# Patient Record
Sex: MALE | Race: WHITE | NOT HISPANIC OR LATINO | Employment: FULL TIME | URBAN - METROPOLITAN AREA
[De-identification: names, ages, dates, MRNs, and addresses within clinical notes are randomized per-mention and may not be internally consistent; named-entity substitution may affect disease eponyms.]

---

## 2019-12-01 ENCOUNTER — HOSPITAL ENCOUNTER (OUTPATIENT)
Facility: MEDICAL CENTER | Age: 59
End: 2019-12-02
Attending: EMERGENCY MEDICINE | Admitting: HOSPITALIST
Payer: COMMERCIAL

## 2019-12-01 DIAGNOSIS — I10 HYPERTENSION, UNSPECIFIED TYPE: ICD-10-CM

## 2019-12-01 DIAGNOSIS — R04.0 EPISTAXIS: ICD-10-CM

## 2019-12-01 DIAGNOSIS — I95.81 HYPOTENSION AFTER PROCEDURE: ICD-10-CM

## 2019-12-01 PROBLEM — I95.89 HYPOTENSION DUE TO HYPOVOLEMIA: Status: ACTIVE | Noted: 2019-12-01

## 2019-12-01 PROBLEM — K92.1 MELENA: Status: ACTIVE | Noted: 2019-12-01

## 2019-12-01 PROBLEM — E86.1 HYPOTENSION DUE TO HYPOVOLEMIA: Status: ACTIVE | Noted: 2019-12-01

## 2019-12-01 PROBLEM — R73.9 HYPERGLYCEMIA: Status: ACTIVE | Noted: 2019-12-01

## 2019-12-01 LAB
ABO + RH BLD: NORMAL
ABO GROUP BLD: NORMAL
ANION GAP SERPL CALC-SCNC: 8 MMOL/L (ref 0–11.9)
BASOPHILS # BLD AUTO: 0.2 % (ref 0–1.8)
BASOPHILS # BLD: 0.03 K/UL (ref 0–0.12)
BLD GP AB SCN SERPL QL: NORMAL
BUN SERPL-MCNC: 36 MG/DL (ref 8–22)
CALCIUM SERPL-MCNC: 8.1 MG/DL (ref 8.5–10.5)
CHLORIDE SERPL-SCNC: 105 MMOL/L (ref 96–112)
CO2 SERPL-SCNC: 24 MMOL/L (ref 20–33)
CREAT SERPL-MCNC: 0.97 MG/DL (ref 0.5–1.4)
EOSINOPHIL # BLD AUTO: 0.01 K/UL (ref 0–0.51)
EOSINOPHIL NFR BLD: 0.1 % (ref 0–6.9)
ERYTHROCYTE [DISTWIDTH] IN BLOOD BY AUTOMATED COUNT: 43.8 FL (ref 35.9–50)
GLUCOSE SERPL-MCNC: 180 MG/DL (ref 65–99)
HCT VFR BLD AUTO: 41.1 % (ref 42–52)
HGB BLD-MCNC: 13.6 G/DL (ref 14–18)
HGB BLD-MCNC: 14.1 G/DL (ref 14–18)
IMM GRANULOCYTES # BLD AUTO: 0.06 K/UL (ref 0–0.11)
IMM GRANULOCYTES NFR BLD AUTO: 0.4 % (ref 0–0.9)
LYMPHOCYTES # BLD AUTO: 1 K/UL (ref 1–4.8)
LYMPHOCYTES NFR BLD: 6.8 % (ref 22–41)
MCH RBC QN AUTO: 29 PG (ref 27–33)
MCHC RBC AUTO-ENTMCNC: 34.3 G/DL (ref 33.7–35.3)
MCV RBC AUTO: 84.6 FL (ref 81.4–97.8)
MONOCYTES # BLD AUTO: 0.31 K/UL (ref 0–0.85)
MONOCYTES NFR BLD AUTO: 2.1 % (ref 0–13.4)
NEUTROPHILS # BLD AUTO: 13.22 K/UL (ref 1.82–7.42)
NEUTROPHILS NFR BLD: 90.4 % (ref 44–72)
NRBC # BLD AUTO: 0 K/UL
NRBC BLD-RTO: 0 /100 WBC
PLATELET # BLD AUTO: 237 K/UL (ref 164–446)
PMV BLD AUTO: 11.4 FL (ref 9–12.9)
POTASSIUM SERPL-SCNC: 3.7 MMOL/L (ref 3.6–5.5)
RBC # BLD AUTO: 4.86 M/UL (ref 4.7–6.1)
RH BLD: NORMAL
SODIUM SERPL-SCNC: 137 MMOL/L (ref 135–145)
WBC # BLD AUTO: 14.6 K/UL (ref 4.8–10.8)

## 2019-12-01 PROCEDURE — 700105 HCHG RX REV CODE 258: Performed by: EMERGENCY MEDICINE

## 2019-12-01 PROCEDURE — G0378 HOSPITAL OBSERVATION PER HR: HCPCS

## 2019-12-01 PROCEDURE — 86900 BLOOD TYPING SEROLOGIC ABO: CPT

## 2019-12-01 PROCEDURE — 86850 RBC ANTIBODY SCREEN: CPT

## 2019-12-01 PROCEDURE — 99219 PR INITIAL OBSERVATION CARE,LEVL II: CPT | Performed by: HOSPITALIST

## 2019-12-01 PROCEDURE — A9270 NON-COVERED ITEM OR SERVICE: HCPCS | Performed by: HOSPITALIST

## 2019-12-01 PROCEDURE — 80048 BASIC METABOLIC PNL TOTAL CA: CPT

## 2019-12-01 PROCEDURE — 700102 HCHG RX REV CODE 250 W/ 637 OVERRIDE(OP): Performed by: HOSPITALIST

## 2019-12-01 PROCEDURE — 36415 COLL VENOUS BLD VENIPUNCTURE: CPT

## 2019-12-01 PROCEDURE — 700101 HCHG RX REV CODE 250: Performed by: HOSPITALIST

## 2019-12-01 PROCEDURE — 99285 EMERGENCY DEPT VISIT HI MDM: CPT

## 2019-12-01 PROCEDURE — 85025 COMPLETE CBC W/AUTO DIFF WBC: CPT

## 2019-12-01 PROCEDURE — 303620 HCHG EPISTAXIS CONTROL

## 2019-12-01 PROCEDURE — 85018 HEMOGLOBIN: CPT

## 2019-12-01 PROCEDURE — 700105 HCHG RX REV CODE 258: Performed by: HOSPITALIST

## 2019-12-01 PROCEDURE — 86901 BLOOD TYPING SEROLOGIC RH(D): CPT

## 2019-12-01 RX ORDER — PROCHLORPERAZINE EDISYLATE 5 MG/ML
5-10 INJECTION INTRAMUSCULAR; INTRAVENOUS EVERY 4 HOURS PRN
Status: DISCONTINUED | OUTPATIENT
Start: 2019-12-01 | End: 2019-12-02 | Stop reason: HOSPADM

## 2019-12-01 RX ORDER — EPINEPHRINE NASAL SOLUTION 1 MG/ML
SOLUTION NASAL
Status: DISPENSED
Start: 2019-12-01 | End: 2019-12-02

## 2019-12-01 RX ORDER — ONDANSETRON 4 MG/1
4 TABLET, ORALLY DISINTEGRATING ORAL EVERY 4 HOURS PRN
Status: DISCONTINUED | OUTPATIENT
Start: 2019-12-01 | End: 2019-12-02 | Stop reason: HOSPADM

## 2019-12-01 RX ORDER — LIDOCAINE HYDROCHLORIDE 20 MG/ML
JELLY TOPICAL
Status: DISPENSED
Start: 2019-12-01 | End: 2019-12-02

## 2019-12-01 RX ORDER — LABETALOL HYDROCHLORIDE 5 MG/ML
10 INJECTION, SOLUTION INTRAVENOUS EVERY 4 HOURS PRN
Status: DISCONTINUED | OUTPATIENT
Start: 2019-12-01 | End: 2019-12-02 | Stop reason: HOSPADM

## 2019-12-01 RX ORDER — FLUTICASONE PROPIONATE 50 MCG
1 SPRAY, SUSPENSION (ML) NASAL
COMMUNITY

## 2019-12-01 RX ORDER — PROMETHAZINE HYDROCHLORIDE 25 MG/1
12.5-25 SUPPOSITORY RECTAL EVERY 4 HOURS PRN
Status: DISCONTINUED | OUTPATIENT
Start: 2019-12-01 | End: 2019-12-02 | Stop reason: HOSPADM

## 2019-12-01 RX ORDER — LIDOCAINE HYDROCHLORIDE AND EPINEPHRINE 10; 10 MG/ML; UG/ML
INJECTION, SOLUTION INFILTRATION; PERINEURAL
Status: DISPENSED
Start: 2019-12-01 | End: 2019-12-02

## 2019-12-01 RX ORDER — CEPHALEXIN 500 MG/1
500 CAPSULE ORAL 4 TIMES DAILY
Status: DISCONTINUED | OUTPATIENT
Start: 2019-12-01 | End: 2019-12-02 | Stop reason: HOSPADM

## 2019-12-01 RX ORDER — ONDANSETRON 2 MG/ML
4 INJECTION INTRAMUSCULAR; INTRAVENOUS EVERY 4 HOURS PRN
Status: DISCONTINUED | OUTPATIENT
Start: 2019-12-01 | End: 2019-12-02 | Stop reason: HOSPADM

## 2019-12-01 RX ORDER — ACETAMINOPHEN 325 MG/1
650 TABLET ORAL EVERY 6 HOURS PRN
Status: DISCONTINUED | OUTPATIENT
Start: 2019-12-01 | End: 2019-12-02 | Stop reason: HOSPADM

## 2019-12-01 RX ORDER — PROMETHAZINE HYDROCHLORIDE 25 MG/1
12.5-25 TABLET ORAL EVERY 4 HOURS PRN
Status: DISCONTINUED | OUTPATIENT
Start: 2019-12-01 | End: 2019-12-02 | Stop reason: HOSPADM

## 2019-12-01 RX ORDER — SODIUM CHLORIDE 9 MG/ML
1000 INJECTION, SOLUTION INTRAVENOUS ONCE
Status: COMPLETED | OUTPATIENT
Start: 2019-12-01 | End: 2019-12-01

## 2019-12-01 RX ORDER — SODIUM CHLORIDE 9 MG/ML
INJECTION, SOLUTION INTRAVENOUS CONTINUOUS
Status: DISCONTINUED | OUTPATIENT
Start: 2019-12-01 | End: 2019-12-02

## 2019-12-01 RX ADMIN — SODIUM CHLORIDE: 9 INJECTION, SOLUTION INTRAVENOUS at 17:14

## 2019-12-01 RX ADMIN — LABETALOL HYDROCHLORIDE 10 MG: 5 INJECTION, SOLUTION INTRAVENOUS at 20:53

## 2019-12-01 RX ADMIN — CEPHALEXIN 500 MG: 500 CAPSULE ORAL at 20:38

## 2019-12-01 RX ADMIN — CEPHALEXIN 500 MG: 500 CAPSULE ORAL at 17:32

## 2019-12-01 RX ADMIN — SODIUM CHLORIDE 1000 ML: 9 INJECTION, SOLUTION INTRAVENOUS at 13:39

## 2019-12-01 ASSESSMENT — LIFESTYLE VARIABLES
AVERAGE NUMBER OF DAYS PER WEEK YOU HAVE A DRINK CONTAINING ALCOHOL: 1
TOTAL SCORE: 0
HAVE YOU EVER FELT YOU SHOULD CUT DOWN ON YOUR DRINKING: NO
TOTAL SCORE: 0
CONSUMPTION TOTAL: NEGATIVE
HAVE PEOPLE ANNOYED YOU BY CRITICIZING YOUR DRINKING: NO
EVER HAD A DRINK FIRST THING IN THE MORNING TO STEADY YOUR NERVES TO GET RID OF A HANGOVER: NO
ON A TYPICAL DAY WHEN YOU DRINK ALCOHOL HOW MANY DRINKS DO YOU HAVE: 1
ALCOHOL_USE: YES
EVER_SMOKED: NEVER
DOES PATIENT WANT TO STOP DRINKING: NO
EVER FELT BAD OR GUILTY ABOUT YOUR DRINKING: NO
HOW MANY TIMES IN THE PAST YEAR HAVE YOU HAD 5 OR MORE DRINKS IN A DAY: 0
TOTAL SCORE: 0

## 2019-12-01 ASSESSMENT — COGNITIVE AND FUNCTIONAL STATUS - GENERAL
SUGGESTED CMS G CODE MODIFIER DAILY ACTIVITY: CH
WALKING IN HOSPITAL ROOM: A LITTLE
DAILY ACTIVITIY SCORE: 24
MOBILITY SCORE: 23
SUGGESTED CMS G CODE MODIFIER MOBILITY: CI

## 2019-12-01 ASSESSMENT — PATIENT HEALTH QUESTIONNAIRE - PHQ9
SUM OF ALL RESPONSES TO PHQ9 QUESTIONS 1 AND 2: 0
2. FEELING DOWN, DEPRESSED, IRRITABLE, OR HOPELESS: NOT AT ALL
1. LITTLE INTEREST OR PLEASURE IN DOING THINGS: NOT AT ALL

## 2019-12-01 ASSESSMENT — COPD QUESTIONNAIRES
COPD SCREENING SCORE: 2
DURING THE PAST 4 WEEKS HOW MUCH DID YOU FEEL SHORT OF BREATH: NONE/LITTLE OF THE TIME
HAVE YOU SMOKED AT LEAST 100 CIGARETTES IN YOUR ENTIRE LIFE: NO/DON'T KNOW
DO YOU EVER COUGH UP ANY MUCUS OR PHLEGM?: NO/ONLY WITH OCCASIONAL COLDS OR INFECTIONS
IN THE PAST 12 MONTHS DO YOU DO LESS THAN YOU USED TO BECAUSE OF YOUR BREATHING PROBLEMS: DISAGREE/UNSURE

## 2019-12-01 NOTE — ASSESSMENT & PLAN NOTE
Controlled with Rhino Rocket placement  Monitor blood pressure and treat accordingly  We will start him on Keflex while packing in place as  He will need to follow-up in 5days with ENT or  in the ED for removal  Stop Flonase

## 2019-12-01 NOTE — ED PROVIDER NOTES
"ED Provider Note    CHIEF COMPLAINT  Chief Complaint   Patient presents with   • Nose Bleed     in left nares at 2am, states that lasted for 45 minutes. states that he can still feel blood trickling in the back his throat. denies taking blood thinner. nasal clip applied.   • Bloody Stools     \"describes as black stools\" and just had 1 episode.       HPI  Isauro Franks is a 59 y.o. male who presents with a nosebleed.  Started early this morning lasted about 45 minutes or so.  Seem to get better so he went back to bed.  He woke up in a pool of blood once again.  He feels like he swallowed a fair bit of blood, and is bringing up fairly large clots.  He did have 2 episodes of blackish diarrhea.  Sounds like he may have high blood pressure at baseline but does not have it followed.  He took an Excedrin yesterday but otherwise denies any routine antiplatelet or anticoagulant medication.  Presently is feeling a little bit dizzy.  Denies any chest or belly pain.  There is no other complaint.    PAST MEDICAL HISTORY  None    FAMILY HISTORY  No family history on file.    SOCIAL HISTORY  Social History     Tobacco Use   • Smoking status: Never Smoker   • Smokeless tobacco: Never Used   Substance Use Topics   • Alcohol use: Yes   • Drug use: Not on file         SURGICAL HISTORY  History reviewed. No pertinent surgical history.    CURRENT MEDICATIONS  Home Medications     Reviewed by Charla Houston R.N. (Registered Nurse) on 12/01/19 at 1216  Med List Status: Partial   Medication Last Dose Status        Patient Roberto Taking any Medications                       I have reviewed the nurses notes and/or the list brought with the patient.    ALLERGIES  No Known Allergies    REVIEW OF SYSTEMS  See HPI for further details. Review of systems as above, otherwise all other systems are negative.     PHYSICAL EXAM  VITAL SIGNS: BP (!) 162/104   Pulse (!) 101   Temp 36.6 °C (97.9 °F) (Oral)   Resp (!) 30   Ht 1.803 m (5' 11\") "   Wt 114.2 kg (251 lb 12.3 oz)   SpO2 89%   BMI 35.11 kg/m²     Constitutional: Well appearing patient in no acute distress.  Not toxic, nor ill in appearance.  HENT: Mucus membranes moist.  Oropharynx is clear.  Blood from the left naris.  Eyes: Pupils equally round.  No scleral icterus.   Neck: Full nontender range of motion.  Lymphatic: No cervical lymphadenopathy noted.   Cardiovascular: Mildly tachycardic.  No murmurs, rubs, nor gallop appreciated.   Thorax & Lungs: Chest is nontender.  Lungs are clear to auscultation with good air movement bilaterally.  No wheeze, rhonchi, nor rales.   Abdomen: Soft, with no tenderness, rebound nor guarding.  No mass, pulsatile mass, nor hepatosplenomegaly appreciated.  Skin: No purpura nor petechia noted.  Extremities/Musculoskeletal: No sign of trauma.  Calves are nontender with no cords nor edema.  No Olayinka's sign.  Pulses are intact all around.   Neurologic: Alert & oriented.  Strength and sensation is intact all around.   Psychiatric: Normal affect appropriate for the clinical situation.    LABS  Labs Reviewed   CBC WITH DIFFERENTIAL - Abnormal; Notable for the following components:       Result Value    WBC 14.6 (*)     Hematocrit 41.1 (*)     Neutrophils-Polys 90.40 (*)     Lymphocytes 6.80 (*)     Neutrophils (Absolute) 13.22 (*)     All other components within normal limits   BASIC METABOLIC PANEL - Abnormal; Notable for the following components:    Glucose 180 (*)     Bun 36 (*)     Calcium 8.1 (*)     All other components within normal limits   COD (ADULT)   ABO RH CONFIRM   ESTIMATED GFR         MEDICAL RECORD  I have reviewed patient's medical record and pertinent results are listed above.    COURSE & MEDICAL DECISION MAKING  I have reviewed any medical record information, laboratory studies and radiographic results as noted above.  Patient presents with nosebleed.  I do note that he was hypertensive in triage, however as I am talking to him his blood pressure  is 130/80, heart rate is between 101 10..  I had nursing place an IV, while I was preparing nasal packing.  With this he became very lightheaded, pale, diaphoretic.  He was already on the monitor, had a pressure drop down to the 80s systolic, and a pulse rate was dropped to 60.  Initially thought this could be a vasovagal reaction to the IV and he was given juice.  However soon thereafter he threw up about a liter and a half of blood.  At this point, I do not have time to closely examine his nares.  I placed a rapid Rhino and inflated it.    IVF is given for tachycardia and blood loss.  Upon recheck his heart rate is improved.    Patient was rechecked several times has had no further bleeding either posteriorly or anteriorly after the rapid Rhino was placed.  Blood counts are returning.  First hematocrit is just slightly low at 41.    At this point, I would like to watch the patient in the hospital.  I do see that his blood pressure is crept up once again.  With that episode of hypotension, I am inclined at this point just to watch him, particular as he is now had his bleeding tamponaded.  Given that the rapid Rhino has been effective, I anticipate that this is an anterior epistaxis.    Case is discussed with the [facility] hospitalist, Dr. Serrano, who has admitted him.    FINAL IMPRESSION  1. Epistaxis    2. Hypotension after procedure    3. Hypertension, unspecified type           This dictation was created using voice recognition software.    Electronically signed by: Que Gutierrez, 12/1/2019 2:26 PM

## 2019-12-01 NOTE — ED TRIAGE NOTES
"Chief Complaint   Patient presents with   • Nose Bleed     in left nares at 2am, states that lasted for 45 minutes. states that he can still feel blood trickling in the back his throat. denies taking blood thinner. nasal clip applied.   • Bloody Stools     \"describes as black stools\" and just had 1 episode.     Educated on triage process. Instructed to notify staff for any changes.  "

## 2019-12-01 NOTE — ASSESSMENT & PLAN NOTE
Monitor blood pressure if persistently elevated start medical therapy  Reviewed with patient lifestyle changes for blood pressure control  Recommended monitoring blood pressure keeping a log and following up with PCP after discharge  Reviewed long-term risks of uncontrolled hypertension

## 2019-12-01 NOTE — ASSESSMENT & PLAN NOTE
And hematemesis likely related to his epistaxis    Recheck hemoglobin later today  Transfuse if hemoglobin less than 7  IV fluids for hydration

## 2019-12-01 NOTE — ED NOTES
Pt reports feeling bad, pt became pale and diaphoretic after piv started. Pt then vomitted about 300cc of bloody thin vomit with large clots. md at bedside and placed rhino rocket into nare. Iv fluids started.

## 2019-12-02 ENCOUNTER — PATIENT OUTREACH (OUTPATIENT)
Dept: HEALTH INFORMATION MANAGEMENT | Facility: OTHER | Age: 59
End: 2019-12-02

## 2019-12-02 VITALS
HEART RATE: 92 BPM | OXYGEN SATURATION: 92 % | RESPIRATION RATE: 17 BRPM | BODY MASS INDEX: 35.25 KG/M2 | WEIGHT: 251.77 LBS | SYSTOLIC BLOOD PRESSURE: 162 MMHG | HEIGHT: 71 IN | DIASTOLIC BLOOD PRESSURE: 96 MMHG | TEMPERATURE: 98 F

## 2019-12-02 LAB
ANION GAP SERPL CALC-SCNC: 11 MMOL/L (ref 0–11.9)
APTT PPP: 27.1 SEC (ref 24.7–36)
BASOPHILS # BLD AUTO: 0.2 % (ref 0–1.8)
BASOPHILS # BLD: 0.03 K/UL (ref 0–0.12)
BUN SERPL-MCNC: 42 MG/DL (ref 8–22)
CALCIUM SERPL-MCNC: 8.2 MG/DL (ref 8.5–10.5)
CHLORIDE SERPL-SCNC: 105 MMOL/L (ref 96–112)
CO2 SERPL-SCNC: 24 MMOL/L (ref 20–33)
CREAT SERPL-MCNC: 1.17 MG/DL (ref 0.5–1.4)
EOSINOPHIL # BLD AUTO: 0.05 K/UL (ref 0–0.51)
EOSINOPHIL NFR BLD: 0.4 % (ref 0–6.9)
ERYTHROCYTE [DISTWIDTH] IN BLOOD BY AUTOMATED COUNT: 44.4 FL (ref 35.9–50)
GLUCOSE SERPL-MCNC: 115 MG/DL (ref 65–99)
HCT VFR BLD AUTO: 34.8 % (ref 42–52)
HGB BLD-MCNC: 11.9 G/DL (ref 14–18)
IMM GRANULOCYTES # BLD AUTO: 0.04 K/UL (ref 0–0.11)
IMM GRANULOCYTES NFR BLD AUTO: 0.3 % (ref 0–0.9)
INR PPP: 1.06 (ref 0.87–1.13)
LYMPHOCYTES # BLD AUTO: 2.8 K/UL (ref 1–4.8)
LYMPHOCYTES NFR BLD: 22.7 % (ref 22–41)
MCH RBC QN AUTO: 28.8 PG (ref 27–33)
MCHC RBC AUTO-ENTMCNC: 34.2 G/DL (ref 33.7–35.3)
MCV RBC AUTO: 84.3 FL (ref 81.4–97.8)
MONOCYTES # BLD AUTO: 0.65 K/UL (ref 0–0.85)
MONOCYTES NFR BLD AUTO: 5.3 % (ref 0–13.4)
NEUTROPHILS # BLD AUTO: 8.78 K/UL (ref 1.82–7.42)
NEUTROPHILS NFR BLD: 71.1 % (ref 44–72)
NRBC # BLD AUTO: 0 K/UL
NRBC BLD-RTO: 0 /100 WBC
PLATELET # BLD AUTO: 182 K/UL (ref 164–446)
PMV BLD AUTO: 10.8 FL (ref 9–12.9)
POTASSIUM SERPL-SCNC: 3.2 MMOL/L (ref 3.6–5.5)
PROTHROMBIN TIME: 14.1 SEC (ref 12–14.6)
RBC # BLD AUTO: 4.13 M/UL (ref 4.7–6.1)
SODIUM SERPL-SCNC: 140 MMOL/L (ref 135–145)
WBC # BLD AUTO: 12.4 K/UL (ref 4.8–10.8)

## 2019-12-02 PROCEDURE — 96374 THER/PROPH/DIAG INJ IV PUSH: CPT

## 2019-12-02 PROCEDURE — A9270 NON-COVERED ITEM OR SERVICE: HCPCS | Performed by: HOSPITALIST

## 2019-12-02 PROCEDURE — 85610 PROTHROMBIN TIME: CPT

## 2019-12-02 PROCEDURE — 99285 EMERGENCY DEPT VISIT HI MDM: CPT

## 2019-12-02 PROCEDURE — 700111 HCHG RX REV CODE 636 W/ 250 OVERRIDE (IP): Performed by: HOSPITALIST

## 2019-12-02 PROCEDURE — 80048 BASIC METABOLIC PNL TOTAL CA: CPT

## 2019-12-02 PROCEDURE — 36415 COLL VENOUS BLD VENIPUNCTURE: CPT

## 2019-12-02 PROCEDURE — 85025 COMPLETE CBC W/AUTO DIFF WBC: CPT

## 2019-12-02 PROCEDURE — 700105 HCHG RX REV CODE 258: Performed by: HOSPITALIST

## 2019-12-02 PROCEDURE — 90686 IIV4 VACC NO PRSV 0.5 ML IM: CPT | Performed by: HOSPITALIST

## 2019-12-02 PROCEDURE — 700102 HCHG RX REV CODE 250 W/ 637 OVERRIDE(OP): Performed by: HOSPITALIST

## 2019-12-02 PROCEDURE — 90471 IMMUNIZATION ADMIN: CPT

## 2019-12-02 PROCEDURE — 85730 THROMBOPLASTIN TIME PARTIAL: CPT

## 2019-12-02 PROCEDURE — G0378 HOSPITAL OBSERVATION PER HR: HCPCS

## 2019-12-02 PROCEDURE — 303620 HCHG EPISTAXIS CONTROL

## 2019-12-02 PROCEDURE — 96376 TX/PRO/DX INJ SAME DRUG ADON: CPT

## 2019-12-02 PROCEDURE — 96375 TX/PRO/DX INJ NEW DRUG ADDON: CPT

## 2019-12-02 PROCEDURE — 99217 PR OBSERVATION CARE DISCHARGE: CPT | Performed by: HOSPITALIST

## 2019-12-02 RX ORDER — POTASSIUM CHLORIDE 20 MEQ/1
40 TABLET, EXTENDED RELEASE ORAL ONCE
Status: COMPLETED | OUTPATIENT
Start: 2019-12-02 | End: 2019-12-02

## 2019-12-02 RX ORDER — CEPHALEXIN 500 MG/1
500 CAPSULE ORAL 4 TIMES DAILY
Qty: 16 CAP | Refills: 0 | Status: SHIPPED | OUTPATIENT
Start: 2019-12-02 | End: 2019-12-06

## 2019-12-02 RX ORDER — AMLODIPINE BESYLATE 5 MG/1
5 TABLET ORAL DAILY
Qty: 30 TAB | Refills: 3 | Status: SHIPPED | OUTPATIENT
Start: 2019-12-02 | End: 2019-12-20

## 2019-12-02 RX ADMIN — LABETALOL HYDROCHLORIDE 10 MG: 5 INJECTION, SOLUTION INTRAVENOUS at 05:22

## 2019-12-02 RX ADMIN — LABETALOL HYDROCHLORIDE 10 MG: 5 INJECTION, SOLUTION INTRAVENOUS at 00:33

## 2019-12-02 RX ADMIN — CEPHALEXIN 500 MG: 500 CAPSULE ORAL at 08:24

## 2019-12-02 RX ADMIN — POTASSIUM CHLORIDE 40 MEQ: 1500 TABLET, EXTENDED RELEASE ORAL at 12:10

## 2019-12-02 RX ADMIN — LABETALOL HYDROCHLORIDE 10 MG: 5 INJECTION, SOLUTION INTRAVENOUS at 06:52

## 2019-12-02 RX ADMIN — INFLUENZA A VIRUS A/BRISBANE/02/2018 IVR-190 (H1N1) ANTIGEN (FORMALDEHYDE INACTIVATED), INFLUENZA A VIRUS A/KANSAS/14/2017 X-327 (H3N2) ANTIGEN (FORMALDEHYDE INACTIVATED), INFLUENZA B VIRUS B/PHUKET/3073/2013 ANTIGEN (FORMALDEHYDE INACTIVATED), AND INFLUENZA B VIRUS B/MARYLAND/15/2016 BX-69A ANTIGEN (FORMALDEHYDE INACTIVATED) 0.5 ML: 15; 15; 15; 15 INJECTION, SUSPENSION INTRAMUSCULAR at 05:22

## 2019-12-02 RX ADMIN — SODIUM CHLORIDE: 9 INJECTION, SOLUTION INTRAVENOUS at 06:11

## 2019-12-02 RX ADMIN — CEPHALEXIN 500 MG: 500 CAPSULE ORAL at 12:09

## 2019-12-02 RX ADMIN — POTASSIUM BICARBONATE 25 MEQ: 978 TABLET, EFFERVESCENT ORAL at 05:22

## 2019-12-02 NOTE — PROGRESS NOTES
Received Bedside report. Assumed care at 1900. This pt is AOx4, ambulatory with SBA, voiding adequately, reports no pain, some pressure from rhino rocket in L nare. Patient and RN discussed plan of care: questions answered. Labs noted, assessment complete, patient tolerating cardiac diet. Tele box in place. Pt is on RA. Call light in place, fall precautions in place, patient educated on importance of calling for assistance. No additional needs at this time. BP at 176/103, hr 103, prn labetolol given. Will recheck BP

## 2019-12-02 NOTE — PROGRESS NOTES
"Patient arrived to T715 (1) with Flex RN on [facility]. Alert and oriented. No pain, just \"discomfort from the pressure\" in his nose from the Rapid Rhino (left nare). Vital signs obtained by CNA. On room air. Tele monitor placed. Significant Other at bedside.   "

## 2019-12-02 NOTE — DISCHARGE SUMMARY
"Discharge Summary    CHIEF COMPLAINT ON ADMISSION  Chief Complaint   Patient presents with   • Nose Bleed     in left nares at 2am, states that lasted for 45 minutes. states that he can still feel blood trickling in the back his throat. denies taking blood thinner. nasal clip applied.   • Bloody Stools     \"describes as black stools\" and just had 1 episode.       Reason for Admission  Nosebleed; Dark Stools     Admission Date  12/1/2019    CODE STATUS  Prior    HPI & HOSPITAL COURSE  This is a 59 y.o. male here with history of hypertension comes into the emergency room for epistaxis the last for 45 minutes and had 2 melenic stools and afterwards.  In the emergency room he was severely lightheaded, diaphoretic and had bloody emesis.  He was found to be hypotensive and bradycardic.  Patient denies any use of anticoagulation or anti-platelet agent.  WBC was 14.6, hemoglobin 14.1, PT and INR were within normal limits.  His nose was packed in the ED and was started on aggressive IV fluids.  His hemoglobin remained stable throughout his hospital stay and was not orthostatic upon discharge.  He is a follow appointment with his primary care doctor and ENT.  He was found to be hypertensive upon discharge and I started Norvasc.       Therefore, he is discharged in good and stable condition to home with close outpatient follow-up.    observation    Discharge Date  12/2/2019    FOLLOW UP ITEMS POST DISCHARGE  Follow-up with ENT    DISCHARGE DIAGNOSES  Principal Problem:    Epistaxis POA: Unknown  Active Problems:    Essential hypertension POA: Unknown    Hyperglycemia POA: Unknown    Melena POA: Unknown    Hypotension due to hypovolemia POA: Unknown  Resolved Problems:    * No resolved hospital problems. *      FOLLOW UP  Future Appointments   Date Time Provider Department Center   12/13/2019  2:20 PM Sherrie Hu M.D. [facility]     ENT      Please get a referral from your PCP for an ENT provider as soon as possible. " Thank you.       MEDICATIONS ON DISCHARGE     Medication List      START taking these medications      Instructions   amLODIPine 5 MG Tabs  Commonly known as:  NORVASC   Take 1 Tab by mouth every day.  Dose:  5 mg     cephALEXin 500 MG Caps  Commonly known as:  KEFLEX   Take 1 Cap by mouth 4 times a day for 4 days.  Dose:  500 mg        CONTINUE taking these medications      Instructions   fluticasone 50 MCG/ACT nasal spray  Commonly known as:  FLONASE   Spray 1 Spray in nose 1 time daily as needed (for allergies).  Dose:  1 Spray            Allergies  No Known Allergies    DIET  No orders of the defined types were placed in this encounter.      ACTIVITY  As tolerated.  Weight bearing as tolerated    CONSULTATIONS  none    PROCEDURES  None    LABORATORY  Lab Results   Component Value Date    SODIUM 140 12/02/2019    POTASSIUM 3.2 (L) 12/02/2019    CHLORIDE 105 12/02/2019    CO2 24 12/02/2019    GLUCOSE 115 (H) 12/02/2019    BUN 42 (H) 12/02/2019    CREATININE 1.17 12/02/2019        Lab Results   Component Value Date    WBC 12.4 (H) 12/02/2019    HEMOGLOBIN 11.9 (L) 12/02/2019    HEMATOCRIT 34.8 (L) 12/02/2019    PLATELETCT 182 12/02/2019        Total time of the discharge process exceeds 50 minutes.

## 2019-12-02 NOTE — PROGRESS NOTES
Patient is A&Ox4. Discharge instructions. Personal belongings in possession with patient. PIV and tele monitor removed. Copy of discharge instructions in patient chart, signed and reviewed. Patient verbalizes the understanding of the discharge instructions. Questions and concerns addressed prior to leaving the unit. Patient is discharged to home.

## 2019-12-02 NOTE — DISCHARGE INSTRUCTIONS
Discharge Instructions    Discharged to home by car with relative. Discharged via wheelchair, hospital escort: Yes.  Special equipment needed: Not Applicable    Be sure to schedule a follow-up appointment with your primary care doctor or any specialists as instructed.     Discharge Plan:   Diet Plan: Discussed  Activity Level: Discussed  Confirmed Follow up Appointment: Appointment Scheduled  Confirmed Symptoms Management: Discussed  Medication Reconciliation Updated: Yes  Influenza Vaccine Indication: Indicated: 9 to 64 years of age  Influenza Vaccine Given - only chart on this line when given: Influenza Vaccine Given (See MAR)    I understand that a diet low in cholesterol, fat, and sodium is recommended for good health. Unless I have been given specific instructions below for another diet, I accept this instruction as my diet prescription.   Other diet: Regular    Special Instructions: None    · Is patient discharged on Warfarin / Coumadin?   No     Depression / Suicide Risk    As you are discharged from this [facility] facility, it is important to learn how to keep safe from harming yourself.    Recognize the warning signs:  · Abrupt changes in personality, positive or negative- including increase in energy   · Giving away possessions  · Change in eating patterns- significant weight changes-  positive or negative  · Change in sleeping patterns- unable to sleep or sleeping all the time   · Unwillingness or inability to communicate  · Depression  · Unusual sadness, discouragement and loneliness  · Talk of wanting to die  · Neglect of personal appearance   · Rebelliousness- reckless behavior  · Withdrawal from people/activities they love  · Confusion- inability to concentrate     If you or a loved one observes any of these behaviors or has concerns about self-harm, here's what you can do:  · Talk about it- your feelings and reasons for harming yourself  · Remove any means that you might use to hurt yourself  (examples: pills, rope, extension cords, firearm)  · Get professional help from the community (Mental Health, Substance Abuse, psychological counseling)  · Do not be alone:Call your Safe Contact- someone whom you trust who will be there for you.  · Call your local CRISIS HOTLINE 493-7399 or 442-338-8917  · Call your local Children's Mobile Crisis Response Team [place] (571) 727-1963 or www.IdeaOffer  · Call the toll free National Suicide Prevention Hotlines   · National Suicide Prevention Lifeline 000-960-DAXU (3141)  · National Hope Line Network 800-SUICIDE (703-6982)      Nosebleed  Nosebleeds are common. A nosebleed can be caused by many things, including:  · Getting hit hard in the nose.  · Infections.  · Dryness in your nose.  · A dry climate.  · Medicines.  · Picking your nose.  · Your home heating and cooling systems.  HOME CARE   · Try controlling your nosebleed by pinching your nostrils gently. Do this for at least 10 minutes.  · Avoid blowing or sniffing your nose for a number of hours after having a nosebleed.  · Do not put gauze inside of your nose yourself. If your nose was packed by your doctor, try to keep the pack inside of your nose until your doctor removes it.  ¨ If a gauze pack was used and it starts to fall out, gently replace it or cut off the end of it.  ¨ If a balloon catheter was used to pack your nose, do not cut or remove it unless told by your doctor.  · Avoid lying down while you are having a nosebleed. Sit up and lean forward.  · Use a nasal spray decongestant to help with a nosebleed as told by your doctor.  · Do not use petroleum jelly or mineral oil in your nose. These can drip into your lungs.  · Keep your house humid by using:  ¨ Less air conditioning.  ¨ A humidifier.  · Aspirin and blood thinners make bleeding more likely. If you are prescribed these medicines and you have nosebleeds, ask your doctor if you should stop taking the medicines or adjust the dose. Do not  stop medicines unless told by your doctor.  · Resume your normal activities as you are able. Avoid straining, lifting, or bending at your waist for several days.  · If your nosebleed was caused by dryness in your nose, use over-the-counter saline nasal spray or gel. If you must use a lubricant:  ¨ Choose one that is water-soluble.  ¨ Use it only as needed.  ¨ Do not use it within several hours of lying down.  · Keep all follow-up visits as told by your doctor. This is important.  GET HELP IF:  · You have a fever.  · You get frequent nosebleeds.  · You are getting nosebleeds more often.  GET HELP RIGHT AWAY IF:  · Your nosebleed lasts longer than 20 minutes.  · Your nosebleed occurs after an injury to your face, and your nose looks crooked or broken.  · You have unusual bleeding from other parts of your body.  · You have unusual bruising on other parts of your body.  · You feel light-headed or dizzy.  · You become sweaty.  · You throw up (vomit) blood.  · You have a nosebleed after a head injury.  This information is not intended to replace advice given to you by your health care provider. Make sure you discuss any questions you have with your health care provider.  Document Released: 09/26/2009 Document Revised: 01/08/2016 Document Reviewed: 08/03/2015  [employer] Interactive Patient Education © 2017 [employer]

## 2019-12-02 NOTE — CARE PLAN
Problem: Communication  Goal: The ability to communicate needs accurately and effectively will improve  Outcome: PROGRESSING SLOWER THAN EXPECTED   Patient educated to utilize call light. Patient oriented to hospital room. Patient encouraged to ask questions about plan of care. Patient does effectively use call light.    Problem: Safety  Goal: Will remain free from injury  Outcome: PROGRESSING AS EXPECTED   Patient's risk for injury and falls assessed. Appropriate safety precautions in place. Patient educated to utilize call light for needs. Patient verbalizes understanding.

## 2019-12-02 NOTE — DISCHARGE PLANNING
"Patient is a 59-year old  man who lives in [place], [place] with his spouse, \"Charla\" and their two adults sons (ages 24, and 27). Patient works full-time as the  for the [employer].     No current PCP. Reports he has not been to a doctor in about 10 years. Patient is agreeable to PCP through [employer], is agreeable to now yearly physical labs/routine visits. No current specialists at this time. No issues with ADLs or IADLs.     No issues with AOD, reports 1-beer on Friday evening. Reports hx of depression. Episodic depression in his lifetime, no medication needed, last episode was 15 years ago. No home O2. Pharmacy is [facility] on [address]. No AD on file, is full code. Upon D/C family will come pick him up.     SW to continue to follow for D/C needs.     PLAN:   1.) Schedule new PCP through [employer].     No other D/C needs identified in initial assessment.     SW contacted scheduling at [phone], requested PCP.     Care Transition Team Assessment    Information Source  Orientation : Oriented x 4  Information Given By: Patient  Who is responsible for making decisions for patient? : Patient    Readmission Evaluation  Is this a readmission?: No    Elopement Risk  Legal Hold: No  Ambulatory or Self Mobile in Wheelchair: Yes  Disoriented: No  Psychiatric Symptoms: None  History of Wandering: No  Elopement this Admit: No  Vocalizing Wanting to Leave: No  Displays Behaviors, Body Language Wanting to Leave: No-Not at Risk for Elopement  Elopement Risk: Not at Risk for Elopement    Interdisciplinary Discharge Planning  Patient or legal guardian wants to designate a caregiver (see row info): No    Discharge Preparedness  What is your plan after discharge?: Home with help  What are your discharge supports?: Spouse  Prior Functional Level: Ambulatory, Independent with Activities of Daily Living, Independent with Medication Management  Difficulity with ADLs: None  Difficulity with IADLs: " None    Functional Assesment  Prior Functional Level: Ambulatory, Independent with Activities of Daily Living, Independent with Medication Management    Finances  Financial Barriers to Discharge: No  Prescription Coverage: Yes    Vision / Hearing Impairment  Vision Impairment : Yes  Right Eye Vision: Wears Glasses  Left Eye Vision: Wears Glasses  Hearing Impairment : No         Advance Directive  Advance Directive?: None    Domestic Abuse  Have you ever been the victim of abuse or violence?: No  Physical Abuse or Sexual Abuse: No  Verbal Abuse or Emotional Abuse: No  Possible Abuse Reported to:: Not Applicable    Psychological Assessment  History of Substance Abuse: None  History of Psychiatric Problems: Yes(Episodic depression, no meds, last episode 15+ years ago)  Non-compliant with Treatment: No  Newly Diagnosed Illness: Yes    Discharge Risks or Barriers  Discharge risks or barriers?: No PCP    Anticipated Discharge Information  Anticipated discharge disposition: Home  Discharge Address: [address] [place] [id number]  Discharge Contact Phone Number: [phone]

## 2019-12-06 ENCOUNTER — HOSPITAL ENCOUNTER (EMERGENCY)
Facility: MEDICAL CENTER | Age: 59
End: 2019-12-06
Attending: EMERGENCY MEDICINE
Payer: COMMERCIAL

## 2019-12-06 VITALS
RESPIRATION RATE: 18 BRPM | OXYGEN SATURATION: 96 % | SYSTOLIC BLOOD PRESSURE: 168 MMHG | WEIGHT: 250.44 LBS | TEMPERATURE: 97.5 F | HEART RATE: 89 BPM | BODY MASS INDEX: 35.06 KG/M2 | HEIGHT: 71 IN | DIASTOLIC BLOOD PRESSURE: 94 MMHG

## 2019-12-06 DIAGNOSIS — R04.0 EPISTAXIS: ICD-10-CM

## 2019-12-06 PROCEDURE — 99282 EMERGENCY DEPT VISIT SF MDM: CPT

## 2019-12-06 NOTE — ED PROVIDER NOTES
"ED Provider Note    CHIEF COMPLAINT  Chief Complaint   Patient presents with   • Nasal Packing Removal     left nare packed and told to come back for removal       HPI  Isauro Franks is a 59 y.o. male who presents for nasal packing removal.  Patient had a severe nosebleed about a week ago that resulted in nasal packing on the left as well as admission to the hospital.  Patient has not had any further bleeding.  No pain.  No fevers.  Will be finishing his Keflex today.    REVIEW OF SYSTEMS  Pertinent negative: As above    PAST MEDICAL HISTORY  Past Medical History:   Diagnosis Date   • Hypertension        SOCIAL HISTORY  Social History     Tobacco Use   • Smoking status: Never Smoker   • Smokeless tobacco: Never Used   Substance Use Topics   • Alcohol use: Yes   • Drug use: Not Currently       SURGICAL HISTORY  History reviewed. No pertinent surgical history.    ALLERGIES  No Known Allergies    PHYSICAL EXAM  VITAL SIGNS: BP (!) 168/94   Pulse 89   Temp 36.4 °C (97.5 °F) (Oral)   Resp 18   Ht 1.803 m (5' 11\")   Wt 113.6 kg (250 lb 7.1 oz)   SpO2 96%   BMI 34.93 kg/m²    Constitutional: Awake and alert. Nontoxic  HENT: There is a Rhino Rocket in the left nostril.  No bleeding in the pharynx.  Nothing on the right side.  Eyes: Grossly normal  Neck: Normal range of motion  Cardiovascular: Normal heart rate   Thorax & Lungs: No respiratory distress  Skin:  No pathologic rash.   Extremities: Well perfused  Psychiatric: Affect normal      COURSE & MEDICAL DECISION MAKING  Patient's nasal pack was removed.  There is no source of bleeding identified.  No recurrent bleeding after removal.  Given advice on what to do if nosebleed returns.  If this is uncontrollable at home the patient should return the ER.        FINAL IMPRESSION  1.  Nasal packing removal      Disposition: home in good condition      This dictation was created using voice recognition software. The accuracy of the dictation is limited to the " abilities of the software.  The nursing notes were reviewed and certain aspects of this information were incorporated into this note.      Electronically signed by: Connor Caraballo, 12/6/2019 9:55 AM

## 2019-12-06 NOTE — ED TRIAGE NOTES
Pt ambulated to triage with   Chief Complaint   Patient presents with   • Nasal Packing Removal     left nare packed and told to come back for removal      Pt Informed regarding triage process and verbalized understanding to inform triage tech or RN for any changes in condition. Placed in lobby.

## 2019-12-13 ENCOUNTER — APPOINTMENT (OUTPATIENT)
Dept: MEDICAL GROUP | Facility: MEDICAL CENTER | Age: 59
End: 2019-12-13
Payer: COMMERCIAL

## 2019-12-20 ENCOUNTER — OFFICE VISIT (OUTPATIENT)
Dept: MEDICAL GROUP | Facility: MEDICAL CENTER | Age: 59
End: 2019-12-20
Payer: COMMERCIAL

## 2019-12-20 VITALS
BODY MASS INDEX: 35.19 KG/M2 | WEIGHT: 251.4 LBS | DIASTOLIC BLOOD PRESSURE: 98 MMHG | HEART RATE: 81 BPM | HEIGHT: 71 IN | SYSTOLIC BLOOD PRESSURE: 158 MMHG | OXYGEN SATURATION: 97 % | TEMPERATURE: 98.1 F

## 2019-12-20 DIAGNOSIS — R73.9 HYPERGLYCEMIA: ICD-10-CM

## 2019-12-20 DIAGNOSIS — Z11.59 NEED FOR HEPATITIS C SCREENING TEST: ICD-10-CM

## 2019-12-20 DIAGNOSIS — I10 ESSENTIAL HYPERTENSION: ICD-10-CM

## 2019-12-20 DIAGNOSIS — Z23 NEED FOR VACCINATION: ICD-10-CM

## 2019-12-20 DIAGNOSIS — Z12.11 SCREENING FOR COLON CANCER: ICD-10-CM

## 2019-12-20 PROBLEM — R04.0 EPISTAXIS: Status: RESOLVED | Noted: 2019-12-01 | Resolved: 2019-12-20

## 2019-12-20 PROBLEM — K92.1 MELENA: Status: RESOLVED | Noted: 2019-12-01 | Resolved: 2019-12-20

## 2019-12-20 PROBLEM — E86.1 HYPOTENSION DUE TO HYPOVOLEMIA: Status: RESOLVED | Noted: 2019-12-01 | Resolved: 2019-12-20

## 2019-12-20 PROBLEM — I95.89 HYPOTENSION DUE TO HYPOVOLEMIA: Status: RESOLVED | Noted: 2019-12-01 | Resolved: 2019-12-20

## 2019-12-20 PROCEDURE — 90715 TDAP VACCINE 7 YRS/> IM: CPT | Performed by: FAMILY MEDICINE

## 2019-12-20 PROCEDURE — 90471 IMMUNIZATION ADMIN: CPT | Performed by: FAMILY MEDICINE

## 2019-12-20 PROCEDURE — 99204 OFFICE O/P NEW MOD 45 MIN: CPT | Mod: 25 | Performed by: FAMILY MEDICINE

## 2019-12-20 RX ORDER — AMLODIPINE BESYLATE 10 MG/1
10 TABLET ORAL DAILY
Qty: 90 TAB | Refills: 3 | Status: SHIPPED | OUTPATIENT
Start: 2019-12-20 | End: 2020-03-11 | Stop reason: SDUPTHER

## 2019-12-20 ASSESSMENT — PATIENT HEALTH QUESTIONNAIRE - PHQ9: CLINICAL INTERPRETATION OF PHQ2 SCORE: 0

## 2019-12-21 NOTE — ASSESSMENT & PLAN NOTE
This is a new problem.  The patient was recently admitted to the hospital for severe epistaxis.  During his hospitalization he was noted to be hypertensive and was started on amlodipine 5 mg daily.  The patient continues to have mildly elevated blood pressures despite reported compliance with his medication.  He denies headaches, vision changes, chest pain, shortness of breath.  He would like to work on lifestyle modification per below.

## 2019-12-21 NOTE — PROGRESS NOTES
Subjective:     CC:  Diagnoses of Essential hypertension, BMI 35.0-35.9,adult, Hyperglycemia, Screening for colon cancer, Need for hepatitis C screening test, and Need for vaccination were pertinent to this visit.    HISTORY OF THE PRESENT ILLNESS: Patient is a 59 y.o. male. He is here today to establish care and discuss the following:  Prior PCP:None.    Essential hypertension  This is a new problem.  The patient was recently admitted to the hospital for severe epistaxis.  During his hospitalization he was noted to be hypertensive and was started on amlodipine 5 mg daily.  The patient continues to have mildly elevated blood pressures despite reported compliance with his medication.  He denies headaches, vision changes, chest pain, shortness of breath.  He would like to work on lifestyle modification per below.    BMI 35.0-35.9,adult  This is a chronic problem.  The patient recognizes he needs to lose weight.  He has tried multiple diets in the past with minimal efficacy.  He is getting some exercise with occasional walks.  He is interested in the health improvement program.    Hyperglycemia  This is a new problem.  Patient was recently admitted to the hospital for severe epistaxis during which time he was noted to have elevated glucose.  The patient denies previous history of elevated glucose or diabetes.      Allergies: Patient has no known allergies.    Current Outpatient Medications Ordered in Epic   Medication Sig Dispense Refill   • amLODIPine (NORVASC) 10 MG Tab Take 1 Tab by mouth every day. 90 Tab 3   • fluticasone (FLONASE) 50 MCG/ACT nasal spray Spray 1 Spray in nose 1 time daily as needed (for allergies).       No current Epic-ordered facility-administered medications on file.        Past Medical History:   Diagnosis Date   • Depression    • Epistaxis 12/1/2019   • Hypertension    • Hypotension due to hypovolemia 12/1/2019   • Melena 12/1/2019       Past Surgical History:   Procedure Laterality Date   •  "VASECTOMY         Social History     Tobacco Use   • Smoking status: Never Smoker   • Smokeless tobacco: Never Used   Substance Use Topics   • Alcohol use: Yes     Comment: Generally 1-2 pints per week   • Drug use: Never       Social History     Patient does not qualify to have social determinant information on file (likely too young).   Social History Narrative   • Not on file       Family History   Problem Relation Age of Onset   • Arthritis Mother    • Diabetes Mother         Type 2   • Cancer Father          of pancreatic cancer at age 82   • Hypertension Father    • Stroke Father    • Alcohol abuse Father    • Cancer Sister          of breast cancer at age 70       Health Maintenance: Colonoscopy ordered; tdap given, obesity counseling per below    ROS:   Gen: no fevers/chills, no changes in weight  Eyes: no changes in vision  ENT: no sore throat or nasal congestion; severe epistaxis resulting in hospital admission 1 month ago; no recurrent nosebleeds  Pulm: no sob, no cough  CV: no chest pain  GI: no nausea/vomiting, no diarrhea or constipation  : no dysuria  MSk: no myalgias  Skin: no rash  Neuro: no headaches, no numbness/tingling  Heme/Lymph: no easy bruising      Objective:     Exam: /98 (BP Location: Left arm, Patient Position: Sitting, BP Cuff Size: Large adult)   Pulse 81   Temp 36.7 °C (98.1 °F) (Temporal)   Ht 1.803 m (5' 11\")   Wt 114 kg (251 lb 6.4 oz)   SpO2 97%  Body mass index is 35.06 kg/m².    General: Obese; Well-developed, well-nourished. Appears stated age.  HEENT: Normocephalic. Conjunctiva clear without injection or scleral icterus. Pupils equal and reactive to light. Ears normal shape and contour, canals are clear bilaterally, tympanic membranes pearly, oropharynx is clear without erythema, edema or exudates.   Neck: Supple; no obvious thyromegaly or nodules appreciated  Pulmonary: Clear to ausculation bilaterally.  No increased work of breathing. No rales, ronchi, " or wheezing.  Cardiovascular: Regular rate and rhythm without murmur.  Abdomen: Obese, Soft, nontender, nondistended. Normal bowel sounds. No guarding or rebound tenderness.  Neurologic:CN III-XII intact  Lymph: No cervical or supraclavicular lymphadenopathy  Skin: Warm and dry.  No obvious lesions.  Musculoskeletal: Normal gait. No extremity cyanosis, clubbing, or edema.  Psych: Euthymic affect. Alert and oriented x 3. Judgment and insight is normal.      Assessment & Plan:   59 y.o. male with the following -    1. Essential hypertension  This is a new problem.  The patient was noted to have elevated blood pressures during his recent hospitalization for severe epistaxis.  Reports compliance with amlodipine 5 mg daily however his blood pressures remain mildly elevated.  - Increase amlodipine to 10mg daily  - Discussed importance of lifestyle modification, regular exercise, healthy diet and weight loss  - REFERRAL TO [facility] HEALTH IMPROVEMENT PROGRAMS (HIP) Services Requested: Physician Medical Weight Management Program, Registered Dietitian for Medical Nutrition Therapy, Registered Dietitian Medicare Obesity Counseling; Reason for Referral? BMI>30...  - Comp Metabolic Panel; Future  - HEMOGLOBIN A1C; Future  - Lipid Profile; Future  - MICROALBUMIN CREAT RATIO URINE; Future  - CBC WITH DIFFERENTIAL; Future    2. BMI 35.0-35.9,adult  This is a chronic problem.  The patient has tried multiple diets previously with minimal efficacy and would like a referral to the health improvement program  - Discussed recommendation for diet rich in vegetables, fruits, fiber, minimal processed/packaged foods, and 150 minutes of moderate exercise per week.  - REFERRAL TO [facility] HEALTH IMPROVEMENT PROGRAMS (HIP) Services Requested: Physician Medical Weight Management Program, Registered Dietitian for Medical Nutrition Therapy, Registered Dietitian Medicare Obesity Counseling; Reason for Referral? BMI>30...  - Comp Metabolic Panel;  Future  - HEMOGLOBIN A1C; Future  - Lipid Profile; Future  - CBC WITH DIFFERENTIAL; Future    3. Hyperglycemia  This is a new problem.  The patient was noted to have elevated blood glucose his recent hospitalization for severe epistaxis.  - HEMOGLOBIN A1C; Future    4. Screening for colon cancer  - REFERRAL TO GI FOR COLONOSCOPY    5. Need for hepatitis C screening test  - HCV Scrn ( 2715-9322 1xLife); Future    6. Need for vaccination  - Tdap Vaccine =>6YO IM      Return in about 4 weeks (around 2020).    Please note this dictation was created using voice recognition software. I have made every reasonable attempt to correct obvious errors, but I expect there may be errors of grammar, and possibly content, that I did not discover before finalizing the note.

## 2019-12-21 NOTE — ASSESSMENT & PLAN NOTE
This is a chronic problem.  The patient recognizes he needs to lose weight.  He has tried multiple diets in the past with minimal efficacy.  He is getting some exercise with occasional walks.  He is interested in the health improvement program.

## 2019-12-21 NOTE — ASSESSMENT & PLAN NOTE
This is a new problem.  Patient was recently admitted to the hospital for severe epistaxis during which time he was noted to have elevated glucose.  The patient denies previous history of elevated glucose or diabetes.

## 2020-01-03 ENCOUNTER — HOSPITAL ENCOUNTER (OUTPATIENT)
Dept: LAB | Facility: MEDICAL CENTER | Age: 60
End: 2020-01-03
Attending: FAMILY MEDICINE
Payer: COMMERCIAL

## 2020-01-03 DIAGNOSIS — Z11.59 NEED FOR HEPATITIS C SCREENING TEST: ICD-10-CM

## 2020-01-03 DIAGNOSIS — I10 ESSENTIAL HYPERTENSION: ICD-10-CM

## 2020-01-03 LAB
ALBUMIN SERPL BCP-MCNC: 4.1 G/DL (ref 3.2–4.9)
ALBUMIN/GLOB SERPL: 1.4 G/DL
ALP SERPL-CCNC: 83 U/L (ref 30–99)
ALT SERPL-CCNC: 27 U/L (ref 2–50)
ANION GAP SERPL CALC-SCNC: 10 MMOL/L (ref 0–11.9)
AST SERPL-CCNC: 21 U/L (ref 12–45)
BASOPHILS # BLD AUTO: 0.6 % (ref 0–1.8)
BASOPHILS # BLD: 0.05 K/UL (ref 0–0.12)
BILIRUB SERPL-MCNC: 1.1 MG/DL (ref 0.1–1.5)
BUN SERPL-MCNC: 19 MG/DL (ref 8–22)
CALCIUM SERPL-MCNC: 8.7 MG/DL (ref 8.5–10.5)
CHLORIDE SERPL-SCNC: 103 MMOL/L (ref 96–112)
CHOLEST SERPL-MCNC: 172 MG/DL (ref 100–199)
CO2 SERPL-SCNC: 29 MMOL/L (ref 20–33)
CREAT SERPL-MCNC: 1.18 MG/DL (ref 0.5–1.4)
CREAT UR-MCNC: 76.7 MG/DL
EOSINOPHIL # BLD AUTO: 0.17 K/UL (ref 0–0.51)
EOSINOPHIL NFR BLD: 2 % (ref 0–6.9)
ERYTHROCYTE [DISTWIDTH] IN BLOOD BY AUTOMATED COUNT: 44.2 FL (ref 35.9–50)
EST. AVERAGE GLUCOSE BLD GHB EST-MCNC: 114 MG/DL
GLOBULIN SER CALC-MCNC: 3 G/DL (ref 1.9–3.5)
GLUCOSE SERPL-MCNC: 107 MG/DL (ref 65–99)
HBA1C MFR BLD: 5.6 % (ref 0–5.6)
HCT VFR BLD AUTO: 41.6 % (ref 42–52)
HCV AB S/CO SERPL IA: NEGATIVE
HDLC SERPL-MCNC: 45 MG/DL
HGB BLD-MCNC: 13.6 G/DL (ref 14–18)
IMM GRANULOCYTES # BLD AUTO: 0.02 K/UL (ref 0–0.11)
IMM GRANULOCYTES NFR BLD AUTO: 0.2 % (ref 0–0.9)
LDLC SERPL CALC-MCNC: 103 MG/DL
LYMPHOCYTES # BLD AUTO: 2.28 K/UL (ref 1–4.8)
LYMPHOCYTES NFR BLD: 27.1 % (ref 22–41)
MCH RBC QN AUTO: 27.8 PG (ref 27–33)
MCHC RBC AUTO-ENTMCNC: 32.7 G/DL (ref 33.7–35.3)
MCV RBC AUTO: 85.1 FL (ref 81.4–97.8)
MICROALBUMIN UR-MCNC: 1.8 MG/DL
MICROALBUMIN/CREAT UR: 23 MG/G (ref 0–30)
MONOCYTES # BLD AUTO: 0.48 K/UL (ref 0–0.85)
MONOCYTES NFR BLD AUTO: 5.7 % (ref 0–13.4)
NEUTROPHILS # BLD AUTO: 5.42 K/UL (ref 1.82–7.42)
NEUTROPHILS NFR BLD: 64.4 % (ref 44–72)
NRBC # BLD AUTO: 0 K/UL
NRBC BLD-RTO: 0 /100 WBC
PLATELET # BLD AUTO: 211 K/UL (ref 164–446)
PMV BLD AUTO: 11.2 FL (ref 9–12.9)
POTASSIUM SERPL-SCNC: 3 MMOL/L (ref 3.6–5.5)
PROT SERPL-MCNC: 7.1 G/DL (ref 6–8.2)
RBC # BLD AUTO: 4.89 M/UL (ref 4.7–6.1)
SODIUM SERPL-SCNC: 142 MMOL/L (ref 135–145)
TRIGL SERPL-MCNC: 121 MG/DL (ref 0–149)
WBC # BLD AUTO: 8.4 K/UL (ref 4.8–10.8)

## 2020-01-03 PROCEDURE — 82043 UR ALBUMIN QUANTITATIVE: CPT

## 2020-01-03 PROCEDURE — 80053 COMPREHEN METABOLIC PANEL: CPT

## 2020-01-03 PROCEDURE — 80061 LIPID PANEL: CPT

## 2020-01-03 PROCEDURE — G0472 HEP C SCREEN HIGH RISK/OTHER: HCPCS

## 2020-01-03 PROCEDURE — 36415 COLL VENOUS BLD VENIPUNCTURE: CPT

## 2020-01-03 PROCEDURE — 83036 HEMOGLOBIN GLYCOSYLATED A1C: CPT

## 2020-01-03 PROCEDURE — 82570 ASSAY OF URINE CREATININE: CPT

## 2020-01-03 PROCEDURE — 85025 COMPLETE CBC W/AUTO DIFF WBC: CPT

## 2020-01-08 DIAGNOSIS — E87.6 HYPOKALEMIA: ICD-10-CM

## 2020-01-08 DIAGNOSIS — I10 ESSENTIAL HYPERTENSION: ICD-10-CM

## 2020-01-08 RX ORDER — ATORVASTATIN CALCIUM 20 MG/1
20 TABLET, FILM COATED ORAL
Qty: 90 TAB | Refills: 3 | Status: SHIPPED | OUTPATIENT
Start: 2020-01-08 | End: 2020-12-14

## 2020-01-08 NOTE — PROGRESS NOTES
Called patient to discuss lab results. Potassium low at 3.0, patient declined supplementation and will incorporate potassium rich foods and repeat lab prior to next appointment on 1/31/2020. Also discussed lipid results and 10 year ASCVD risk of 11.5%, patient is amenable to statin medication in addition to increasing his exercise, improving his diet and working on weight loss. Atorvastatin ordered. BMP ordered.

## 2020-01-31 ENCOUNTER — OFFICE VISIT (OUTPATIENT)
Dept: MEDICAL GROUP | Facility: MEDICAL CENTER | Age: 60
End: 2020-01-31
Payer: COMMERCIAL

## 2020-01-31 VITALS
HEART RATE: 81 BPM | RESPIRATION RATE: 16 BRPM | DIASTOLIC BLOOD PRESSURE: 80 MMHG | SYSTOLIC BLOOD PRESSURE: 148 MMHG | BODY MASS INDEX: 35.14 KG/M2 | OXYGEN SATURATION: 95 % | HEIGHT: 71 IN | WEIGHT: 251 LBS | TEMPERATURE: 99 F

## 2020-01-31 DIAGNOSIS — I10 ESSENTIAL HYPERTENSION: ICD-10-CM

## 2020-01-31 DIAGNOSIS — E87.6 HYPOKALEMIA: ICD-10-CM

## 2020-01-31 PROCEDURE — 99214 OFFICE O/P EST MOD 30 MIN: CPT | Performed by: FAMILY MEDICINE

## 2020-01-31 RX ORDER — LISINOPRIL 10 MG/1
10 TABLET ORAL DAILY
Qty: 90 TAB | Refills: 3 | Status: SHIPPED
Start: 2020-01-31 | End: 2020-03-25

## 2020-01-31 ASSESSMENT — PATIENT HEALTH QUESTIONNAIRE - PHQ9: CLINICAL INTERPRETATION OF PHQ2 SCORE: 0

## 2020-02-01 NOTE — ASSESSMENT & PLAN NOTE
This is a new problem.  Labs on 1/3/2020 demonstrated a potassium of 3.0.  The patient elected to increase his dietary potassium and declined a supplement.  We will repeat labs.

## 2020-02-01 NOTE — PROGRESS NOTES
Subjective:     CC: f/u HTN and labs    HPI:   Isauro presents today with:    Essential hypertension  This is a chronic problem.  The patient's dose of amlodipine was increased from 5mg to 10mg about a month ago.  He reports his blood pressures have significantly improved status post this change however he continues to have intermittent home blood pressure readings >150/90. He denies headaches, vision changes, chest pain, shortness of breath.  He would like to work on lifestyle modification per below.    BMI 35.0-35.9,adult  This is a chronic problem.  The patient recognizes he needs to lose weight.  He has tried multiple diets in the past with minimal efficacy.  He and his wife are starting the Mediterranean diet.  He has increased his daily exercise and is taking more walks during his lunch break.  He is interested in the health improvement program however has not called to schedule an appointment yet.    Hypokalemia  This is a new problem.  Labs on 1/3/2020 demonstrated a potassium of 3.0.  The patient elected to increase his dietary potassium and declined a supplement.  We will repeat labs.      Past Medical History:   Diagnosis Date   • Depression    • Epistaxis 12/1/2019   • Hypertension    • Hypotension due to hypovolemia 12/1/2019   • Melena 12/1/2019       Social History     Tobacco Use   • Smoking status: Never Smoker   • Smokeless tobacco: Never Used   Substance Use Topics   • Alcohol use: Yes     Comment: Generally 1-2 pints per week   • Drug use: Never       Current Outpatient Medications Ordered in Epic   Medication Sig Dispense Refill   • IBUPROFEN PO Take  by mouth.     • lisinopril (PRINIVIL) 10 MG Tab Take 1 Tab by mouth every day. 90 Tab 3   • atorvastatin (LIPITOR) 20 MG Tab Take 1 Tab by mouth every bedtime. 90 Tab 3   • amLODIPine (NORVASC) 10 MG Tab Take 1 Tab by mouth every day. 90 Tab 3   • fluticasone (FLONASE) 50 MCG/ACT nasal spray Spray 1 Spray in nose 1 time daily as needed (for  "allergies).       No current Epic-ordered facility-administered medications on file.        Allergies:  Patient has no known allergies.    Health Maintenance: Patient will inquire about Shingrix at his pharmacy    ROS:  Gen: no fevers/chills, no changes in weight  Eyes: no changes in vision  ENT: no sore throat, no hearing loss, no bloody nose  Pulm: no SOB  CV: no chest pain  GI: no nausea/vomiting, no diarrhea  : no dysuria  MSk: no myalgias  Skin: no rash  Neuro: no headaches, no numbness/tingling  Heme/Lymph: no easy bruising      Objective:       Exam:  /80 (BP Location: Left arm, Patient Position: Sitting, BP Cuff Size: Large adult)   Pulse 81   Temp 37.2 °C (99 °F) (Temporal)   Resp 16   Ht 1.803 m (5' 11\")   Wt 113.9 kg (251 lb)   SpO2 95%   BMI 35.01 kg/m²  Body mass index is 35.01 kg/m².    Gen: Obese; Alert and oriented, No apparent distress  Neck: Neck is supple without lymphadenopathy  Lungs: Normal effort, CTA bilaterally, no wheezes, rhonchi, or rales  CV: Regular rate and rhythm, no murmurs, rubs, or gallops      Assessment & Plan:     59 y.o. male with the following -     1. Essential hypertension  This is a chronic problem.  The patient's dose of amlodipine was recently increased from 5 mg to 10 mg daily.  He reports his home blood pressures have significantly improved status post this change however he continues to have intermittent readings >150/90.  In light of this we will add lisinopril 10 mg to his regimen.  - Encouraged patient to contact the Health Improvement Program to aid in weight loss  - Reiterated importance of lifestyle modification  - Start lisinopril 10 mg daily  - Continue amlodipine 10 mg daily  - CBC WITH DIFFERENTIAL; Future  - Comp Metabolic Panel; Future    2. BMI 35.0-35.9,adult  This is a chronic problem.  The patient was referred to the health improvement program at his last visit however has not called to schedule an appointment yet.  He is incorporating " more exercise into his daily routine and is starting Mediterranean diet.  - Continue healthy lifestyle modification  - Patient encouraged to call to establish with a health improvement program    3. Hypokalemia  This is a new problem.  Labs 1/13/2020 demonstrated potassium 3.0.  The patient elected to increase his dietary potassium intake and declined a supplement. He reports no previous history of hypokalemia.  - Comp Metabolic Panel; Future      Return in about 3 months (around 4/30/2020) for f/u HTN.    Please note this dictation was created using voice recognition software. I have made every reasonable attempt to correct obvious errors, but I expect there may be errors of grammar, and possibly content, that I did not discover before finalizing the note.

## 2020-02-01 NOTE — ASSESSMENT & PLAN NOTE
This is a chronic problem.  The patient recognizes he needs to lose weight.  He has tried multiple diets in the past with minimal efficacy.  He and his wife are starting the Mediterranean diet.  He has increased his daily exercise and is taking more walks during his lunch break.  He is interested in the health improvement program however has not called to schedule an appointment yet.

## 2020-02-01 NOTE — ASSESSMENT & PLAN NOTE
This is a chronic problem.  The patient's dose of amlodipine was increased from 5mg to 10mg about a month ago.  He reports his blood pressures have significantly improved status post this change however he continues to have intermittent home blood pressure readings >150/90. He denies headaches, vision changes, chest pain, shortness of breath.  He would like to work on lifestyle modification per below.

## 2020-03-13 RX ORDER — AMLODIPINE BESYLATE 10 MG/1
10 TABLET ORAL DAILY
Qty: 90 TAB | Refills: 3 | Status: SHIPPED | OUTPATIENT
Start: 2020-03-13 | End: 2021-02-27 | Stop reason: SDUPTHER

## 2020-03-25 ENCOUNTER — OFFICE VISIT (OUTPATIENT)
Dept: HEALTH INFORMATION MANAGEMENT | Facility: MEDICAL CENTER | Age: 60
End: 2020-03-25
Payer: COMMERCIAL

## 2020-03-25 VITALS
SYSTOLIC BLOOD PRESSURE: 128 MMHG | HEART RATE: 75 BPM | OXYGEN SATURATION: 90 % | WEIGHT: 242.1 LBS | DIASTOLIC BLOOD PRESSURE: 78 MMHG | BODY MASS INDEX: 33.89 KG/M2 | HEIGHT: 71 IN

## 2020-03-25 DIAGNOSIS — L91.8 ACROCHORDON: ICD-10-CM

## 2020-03-25 DIAGNOSIS — R73.9 HYPERGLYCEMIA: ICD-10-CM

## 2020-03-25 DIAGNOSIS — R63.5 WEIGHT GAIN: ICD-10-CM

## 2020-03-25 DIAGNOSIS — I10 ESSENTIAL HYPERTENSION: ICD-10-CM

## 2020-03-25 DIAGNOSIS — E78.00 HYPERCHOLESTEROLEMIA: ICD-10-CM

## 2020-03-25 DIAGNOSIS — E87.6 HYPOKALEMIA: ICD-10-CM

## 2020-03-25 DIAGNOSIS — R53.83 LACK OF ENERGY: ICD-10-CM

## 2020-03-25 PROCEDURE — 99205 OFFICE O/P NEW HI 60 MIN: CPT | Performed by: INTERNAL MEDICINE

## 2020-03-25 PROCEDURE — 93000 ELECTROCARDIOGRAM COMPLETE: CPT | Performed by: INTERNAL MEDICINE

## 2020-03-25 PROCEDURE — 97802 MEDICAL NUTRITION INDIV IN: CPT | Performed by: DIETITIAN, REGISTERED

## 2020-03-25 ASSESSMENT — FIBROSIS 4 INDEX: FIB4 SCORE: 1.15

## 2020-03-25 ASSESSMENT — PATIENT HEALTH QUESTIONNAIRE - PHQ9: CLINICAL INTERPRETATION OF PHQ2 SCORE: 0

## 2020-03-25 NOTE — PROGRESS NOTES
"3/25/2020     Isauro Franks 59 y.o.        Time in/out: 9:29-9:57 AM    Anthropometrics/Objective  Vitals:    03/25/20 0850   BP: 128/78   Weight: 109.8 kg (242 lb 1.6 oz)   Height: 1.803 m (5' 11\")     BMI: Body mass index is 33.77 kg/m².  Stated Goal Weight: see MD note  See comprehensive patient history form for further information     Subjective:  Pt is here today for the initial screening visit for the medical weight management program.   - no previous nutrition education  - has stopped drinking regular soda and sugary beverages  - likes to cook and uses the sous-vide cooking method  - has tracked in the past using the [employer] alfred  - currently working from home    See HIP Medical Questionnaire in media for more detailed diet history     Nutrition Diagnosis (PES Statement)  · Obesity related to excessive energy intake and inadequate energy expenditure as evidenced by BMI >30     Client history:  Condition(s) associated with a diagnosis or treatment / Pertinent Medical Hx: hypertension, hyperglycemia, hypokalemia, hypercholesterolemia, acrochordon    Biochemical data, medical test and procedures  Lab Results   Component Value Date/Time    HBA1C 5.6 01/03/2020 07:16 AM     No results found for: POCGLUCOSE  Lab Results   Component Value Date/Time    CHOLSTRLTOT 172 01/03/2020 07:16 AM     (H) 01/03/2020 07:16 AM    HDL 45 01/03/2020 07:16 AM    TRIGLYCERIDE 121 01/03/2020 07:16 AM         Nutrition Intervention  Nutrition Prescription  Recommended Daily Kcals: 1525-0987 Kcal based on REE of 1875 (*4600-6739 per Dr. Clifford's recommendation)   Recommended Daily Protein: 100g or ~30% of kcals      Meal Plan Recommendation   Calorie controlled, high protein, low carb diet    with 1-2 meal replacements per day  1-2 snacks; 1 oz protein + non-starchy veggies or 1 fruit      Comprehensive Nutrition Education Instruction or training leading to in-depth nutrition related knowledge about:   Benefits to " following meal plan, combine carb, protein and fat at each meal, meal timing and spacing, portion control, sweets and alcohol in moderation.  Handouts provided regarding topics discussed:   - Meal Plan   - My Plate Planner    - Snack list with fruit   - Meal ideas   - MyFitnessPal How-To Guide   - MyFitnessPal Recipes Guide    Monitoring & Evaluation Plan    Behavioral-Environmental:  Behavior: Keep a food journal and bring to next appointment   Physical activity: Did not discuss today.    Food / Nutrient Intake:  Food intake: Follow meal plan as discussed. Avoid concentrated sweets and processed carbs. Use the plate method for portion control and macronutrient balance. Limit carbs/starch to up to 1/2 cup per meal. Snacks should be 1oz protein + ns veggies or fruit. Fruit once per day.     Fluid intake: Consume at least 64 oz water per day. Avoid all sweetened beverages. Limit coffee to 1 cup a day (ideally no cream or sugar). Limit or avoid alcohol as discussed with Dr. Clifford.     Physical Signs / Symptoms:  Weight loss towards BMI < 30   Weight change -1-2 lbs per week    Assessment Notes:  Today I oriented Isauro to Dr. Clifford's Medical Weight Management program. Encouraged a higher protein, lower carbohydrate, and calorie controlled meal plan consisting of 3 meals and 1-2 snacks per day with optional use of meal replacements. Encouraged patient to track their food/beverage intake on My Fitness Pal or utilize a written food journal.  We discussed how to build protein into each meal and snack, incorporating 1/2 plate non-starchy vegetable twice daily, optional 1/2 cup of complex carbohydrate at meals if desired, and avoiding refined carbohydrates and simple sugars. Reviewed snack guidelines to include 1 oz protein and optional non-starchy vegetable or 1 serving of fruit.     Reviewed meal timing with Isauro. He will start using 1 ND MR shake for breakfast. He would like to start with 1 MR shake per day  and in the future is interested in using 2 MR shakes per day. Pt plans to track on My Fitness Pal. Provided him a calorie goal of 6183-9915 kcals per day based on Dr. Clifford's recommendation. Next visit suggest to review food journal.     F/U: 2 weeks RD

## 2020-03-25 NOTE — PROGRESS NOTES
"Bariatric Medicine H&P  Chief Complaint   Patient presents with   • Weight Gain       Referred by:  Sherrie Hu M.D.    History of Present Illness:   Isauro Franks is a 59 y.o.  male who presents for weight management and to help address co-morbidities caused by overweight, as below.    The patient realizes he needs to improve his general health and fitness, to be able to manage stress from his job.  He knows he does not eat well, wants to learn to make better food choices.  He has tried Nutrisystem, reducing portions in the past, which did help with weight loss.  He has also used my fitness pal but not recently.  He would like to consider anti-obesity medication if he is unsuccessful with lifestyle change, tracking and meal replacements.  He would also consider referral to bariatric surgery if unsuccessful with medical weight loss.    Brief Diet History (see also RD notes):  AM: Bagel, [employer] Egg McMuffin  Lunch: [place], chips  Dinner: Fast food  Snacks: Trail mix, [place] bar  Drinks: Water, unsweetened tea    I FG: Has not been on glucose lowering agent  HTN: Blood pressure controlled on amlodipine  HLD: Takes daily Lipitor for lipid control    Behavior-Related History:  Binge eating screen: Negative  H/o abuse: Negative     Exercise:   Walking about 1 mile per day     Review of Systems   Lack of energy, cough with sleep.  Past history of nausea vomiting diarrhea, headaches.  Sleep apnea screen: Negative  All other ROS were reviewed with patient today and are negative.      PMH/PSH:  I have reviewed the patient's medical, social and family history, allergies, and medications today.  Prior records reviewed.  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    HENT: No facial plethora.  No Cushingoid features.  No scalloped tongue.  No dental erosions.  No swollen parotids.  Head: Normocephalic.   Eyes: EOM are normal. Pupils are equal, round, and reactive to light. No periorbital edema.  No lateral thinning of eyebrows.  No vertical nystagmus.  Neck: Normal range of motion. Neck supple. No thyromegaly present. No buffalo hump.  Cardiovascular: Normal rate and regular rhythm.  No murmur heard.  Pulmonary/Chest: Effort normal and breath sounds normal. No wheezes.   Abdominal: Soft. Bowel sounds are normal. No pannus but firm, distended abdomen.  No ascites.  No easily palpable hepatosplenomegaly.   Musculoskeletal: Normal range of motion. No edema.   Neurological: Alert and oriented to person, place, and time. Normal reflexes. No cranial nerve deficit. No muscle weakness.  Gait normal.   Skin: Warm and dry. Not diaphoretic.  No acanthosis nigricans.  Not excessively dry, scaly.  No acne.  No bruising/ecchymosis.  No hyperpigmentation.  Neck acrochordon.    Psychiatric: Mood, memory, affect and judgment normal.     Laboratory:   Prior labs reviewed.  EKG: Sinus rhythm, rate 73, no ST elevations or depressions.  Corrected QT 0.430  Ordered, performed in our office today, and reviewed by me today.    Dietitian Assessment: I have reviewed the Dietitian's assessment related to this encounter.       ASSESSMENT/PLAN:  Body mass index is 33.77 kg/m².   Obesity Stage ([place]) 1; Class 1    1. BMI 35.0-35.9,adult     2. Hyperglycemia  Basic Metabolic Panel   3. Essential hypertension     4. Weight gain     5. Hypokalemia  Basic Metabolic Panel   6. Hypercholesterolemia     7. Acrochordon         Based on patient's food recall, very high refined CHO, energy dense foods in his  diet.  Recommend he start tracking, work on improving macronutrient balance, work on timing of meals.  Start also stimulus narrowing as below, to help reverse weight gain.  Update baseline labs given hypokalemia and hyperglycemia.  Consider anti-obesity medication pending course.  Consider referral to bariatric surgery if unsuccessful with medical weight loss.  Acrochordon, hyperglycemia on labs indicative of insulin resistance; may benefit from metformin and/or GLP-1 agonist.    The patient and I have discussed at length and agree to the following recommendations, which are all addressing the above diagnoses:    Weight Goal: 5% wt loss at one month after start (pt goal weight is 180 lb)  Diet:   Work on meal prep!  MR:  1 ND qam instead of fast food  May want to have second at L if he likes them  High Protein/Low Carb dinner and 2 snacks between meals daily  >100 g protein, <100 g total carbs daily  Track daily intake with My Fitness Pal, bring to next visit  64+ oz water per day  Avoid fast food  Physical Activity: Goal is walking 2 miles per day instead of 1  Risk level for moderate/vigorous exercise program:   Low  New Rx:   Patient defers  Consider metformin or GLP-1 agonist versus anti-obesity medication ending course with above measures   Behavior change:   Planning meals/snacks, increasing activity level  Follow-up: one month with MD, 2 wks with RD  Pt would consider referral to bariatric surgery if unsuccessful with [facility]    Face to face time spent 60 minutes,  with >50% of time devoted to one on one counseling on weight management issues, as documented above.      Patient's body mass index is 33.77 kg/m². Exercise and nutrition counseling were performed at this visit.        Thank you for your referral!

## 2020-04-09 ENCOUNTER — TELEMEDICINE (OUTPATIENT)
Dept: HEALTH INFORMATION MANAGEMENT | Facility: MEDICAL CENTER | Age: 60
End: 2020-04-09
Payer: COMMERCIAL

## 2020-04-09 DIAGNOSIS — I10 ESSENTIAL HYPERTENSION: ICD-10-CM

## 2020-04-09 DIAGNOSIS — E66.9 OBESITY, UNSPECIFIED CLASSIFICATION, UNSPECIFIED OBESITY TYPE, UNSPECIFIED WHETHER SERIOUS COMORBIDITY PRESENT: ICD-10-CM

## 2020-04-09 PROCEDURE — 97803 MED NUTRITION INDIV SUBSEQ: CPT | Mod: 95,CR | Performed by: DIETITIAN, REGISTERED

## 2020-04-09 NOTE — PROGRESS NOTES
"Nutrition Reassess: Medical Weight Management   Today's date: 4/9/2020  Referring Provider: No ref. provider found      Time: in/out 3:32-3:55 PM  Visit#: 2    Subjective:  - has been \"hit or miss\" for tracking on My Fitness Pal  - walking 1.5 miles 3-4x/ week (walks a route around house)  - has been following the plate method for meals  - eating less fast food (went to [employer] 1x in the past week)  - pt reports pants are loose (went down 1 belt buckle size)  - has not taken weight at home    Diet history:   Breakfast - egg sandwich (2 eggs, deli slice ham, and cheese, 2 pieces of oat nut bread)  Snack - 0  Lunch - Egg salad sandwich on sliced sourdough bread and coke zero  Snack - 4-5 grapes  Dinner - BBQ ribs from [employer], mariposa slaw, fries, lemonade    Anthropometrics/Objective  Today's weight:  There were no vitals filed for this visit.  BMI:  There is no height or weight on file to calculate BMI.  Starting weight/date 242.1 lb (03/25/20)    Total weight change : no weight taken at today's visit            Meal Plan:   High protein, low carb diet with 1-2 meal replacements per day    ReAssesment/Notes: Isauro has started to work towards his weight loss goals. He is happy that his waist has decreased in size. Since last visit he has been trying to follow the plate method and starting to track on My Fitness Pal. Set pt's goals in My Fitness Pal (1400 kcal, 30% of kcals pro and 30% of kcals carbs). Suggested for Isauro to track daily and include all foods and drinks. He plans to increase his exercise. Pt set his goals to get out of his chair daily and to track on My Fitness Pal. Next visit suggest to review food journal and nutrition basics.     This encounter was conducted via Zoom .   Verbal consent was obtained. Patient's identity was verified.    Follow-up: 2 weeks RD/MD    "

## 2020-04-28 ENCOUNTER — APPOINTMENT (OUTPATIENT)
Dept: HEALTH INFORMATION MANAGEMENT | Facility: MEDICAL CENTER | Age: 60
End: 2020-04-28
Payer: COMMERCIAL

## 2020-05-08 ENCOUNTER — TELEMEDICINE (OUTPATIENT)
Dept: MEDICAL GROUP | Facility: MEDICAL CENTER | Age: 60
End: 2020-05-08
Payer: COMMERCIAL

## 2020-05-08 VITALS
DIASTOLIC BLOOD PRESSURE: 72 MMHG | HEART RATE: 99 BPM | HEIGHT: 71 IN | BODY MASS INDEX: 33.18 KG/M2 | SYSTOLIC BLOOD PRESSURE: 139 MMHG | WEIGHT: 237 LBS

## 2020-05-08 DIAGNOSIS — E66.09 CLASS 1 OBESITY DUE TO EXCESS CALORIES WITH SERIOUS COMORBIDITY AND BODY MASS INDEX (BMI) OF 33.0 TO 33.9 IN ADULT: ICD-10-CM

## 2020-05-08 DIAGNOSIS — Z12.5 PROSTATE CANCER SCREENING: ICD-10-CM

## 2020-05-08 DIAGNOSIS — E87.6 HYPOKALEMIA: ICD-10-CM

## 2020-05-08 DIAGNOSIS — I10 ESSENTIAL HYPERTENSION: ICD-10-CM

## 2020-05-08 PROBLEM — L91.8 ACROCHORDON: Status: RESOLVED | Noted: 2020-03-25 | Resolved: 2020-05-08

## 2020-05-08 PROCEDURE — 99214 OFFICE O/P EST MOD 30 MIN: CPT | Mod: 95,CR | Performed by: FAMILY MEDICINE

## 2020-05-08 SDOH — HEALTH STABILITY: MENTAL HEALTH: HOW MANY STANDARD DRINKS CONTAINING ALCOHOL DO YOU HAVE ON A TYPICAL DAY?: 1 OR 2

## 2020-05-08 SDOH — HEALTH STABILITY: MENTAL HEALTH: HOW OFTEN DO YOU HAVE A DRINK CONTAINING ALCOHOL?: MONTHLY OR LESS

## 2020-05-08 ASSESSMENT — FIBROSIS 4 INDEX: FIB4 SCORE: 1.15

## 2020-05-08 NOTE — PROGRESS NOTES
Telemedicine Visit: Established Patient     This encounter was conducted via [employer]   Verbal consent was obtained. Patient's identity was verified.    Subjective:   CC:   Isauro Franks is a 59 y.o. male presenting for evaluation and management of:    Essential hypertension  This is a chronic problem. The patient reports his blood pressures have been 130s-140/70s-80s. He is on amlodipine 10mg daily. He stopped lisinopril 10mg shortly after starting the medication secondary to dry cough. He has started working with Dr. Clifford and the HIP team and has lost 14 pounds. The patient denies chest pain, shortness of breath, headaches, vision changes, lightheadedness, dizziness, or medication side effects.      Class 1 obesity due to excess calories with serious comorbidity and body mass index (BMI) of 33.0 to 33.9 in adult  This is a chronic problem.  The patient started working with the health improvement program and has lost 14 pounds since her last visit.  He does state he struggling with a low-carb diet as pasta, bread, and potatoes are his favorite foods.  He has tried to increase his exercise and is walking twice daily and gardening.    Hypokalemia  Labs 1/3/2020 demonstrated potassium of 3.0.  The patient has not been able to complete a repeat lab due to COVID-19.  He has tried to increase his dietary potassium intake.      ROS   Denies any recent fevers or chills. No nausea or vomiting. No chest pains or shortness of breath.     No Known Allergies    Current medicines (including changes today)  Current Outpatient Medications   Medication Sig Dispense Refill   • amLODIPine (NORVASC) 10 MG Tab Take 1 Tab by mouth every day. 90 Tab 3   • IBUPROFEN PO Take  by mouth.     • atorvastatin (LIPITOR) 20 MG Tab Take 1 Tab by mouth every bedtime. 90 Tab 3   • fluticasone (FLONASE) 50 MCG/ACT nasal spray Spray 1 Spray in nose 1 time daily as needed (for allergies).       No current facility-administered  "medications for this visit.        Patient Active Problem List    Diagnosis Date Noted   • Weight gain 2020   • Hypercholesterolemia 2020   • Hypokalemia 2020   • Class 1 obesity due to excess calories with serious comorbidity and body mass index (BMI) of 33.0 to 33.9 in adult 2019   • Essential hypertension 2019   • Hyperglycemia 2019       Family History   Problem Relation Age of Onset   • Arthritis Mother    • Diabetes Mother         Type 2   • Cancer Father          of pancreatic cancer at age 82   • Hypertension Father    • Stroke Father    • Alcohol abuse Father    • Cancer Sister          of breast cancer at age 70       He  has a past medical history of Depression, Epistaxis (2019), Hypertension, Hypotension due to hypovolemia (2019), and Melena (2019).  He  has a past surgical history that includes vasectomy.       Objective:   /72 (BP Location: Left arm, Patient Position: Sitting) Comment: pt. reported Comment (BP Location): pt. reported Comment (Patient Position): pt. reported  Pulse 99 Comment: pt. reported  Ht 1.803 m (5' 11\") Comment: pt. reported  Wt 107.5 kg (237 lb) Comment: pt. reported  BMI 33.05 kg/m²     Physical Exam:  Constitutional: Alert, no distress, well-groomed.  Skin: No rashes in visible areas.  Eye: Round. Conjunctiva clear, lids normal. No icterus.   ENMT: Lips pink without lesions, good dentition, moist mucous membranes. Phonation normal.  Respiratory: Unlabored respiratory effort, no cough or audible wheeze  Psych: Alert and oriented x3, normal affect and mood.       Assessment and Plan:   The following treatment plan was discussed:     1. Essential hypertension  This is a chronic problem.  The patient is on amlodipine 10 mg daily.  He did not tolerate lisinopril due to a dry cough.  His home blood pressures are borderline.  -Continue home BP log, contact me if BP >140/90  -Continue amlodipine 10 mg " daily  -Continue diet, exercise, and weight loss    2. Class 1 obesity due to excess calories with serious comorbidity and body mass index (BMI) of 33.0 to 33.9 in adult  This is a chronic problem.  The patient has lost 14 pounds in the last 3 months.  He is working with the health improvement program.  -Continue healthy diet, regular exercise, and participation with HIP    3. Hypokalemia  Labs 1/2020 revealed a potassium of 3.0.  The patient has not repeated a BMP due to COVID-19. He has increased dietary potassium intake.  -Encouraged patient to complete repeat BMP    4. Prostate cancer screening  - PROSTATE SPECIFIC AG SCREENING; Future        Follow-up: Return in about 3 months (around 8/8/2020).

## 2020-05-08 NOTE — ASSESSMENT & PLAN NOTE
This is a chronic problem. The patient reports his blood pressures have been 130s-140/70s-80s. He is on amlodipine 10mg daily. He stopped lisinopril 10mg shortly after starting the medication secondary to dry cough. He has started working with Dr. Clifford and the HIP team and has lost 14 pounds. The patient denies chest pain, shortness of breath, headaches, vision changes, lightheadedness, dizziness, or medication side effects.

## 2020-05-08 NOTE — ASSESSMENT & PLAN NOTE
Labs 1/3/2020 demonstrated potassium of 3.0.  The patient has not been able to complete a repeat lab due to COVID-19.  He has tried to increase his dietary potassium intake.

## 2020-05-08 NOTE — ASSESSMENT & PLAN NOTE
This is a chronic problem.  The patient started working with the health improvement program and has lost 14 pounds since her last visit.  He does state he struggling with a low-carb diet as pasta, bread, and potatoes are his favorite foods.  He has tried to increase his exercise and is walking twice daily and gardening.

## 2020-06-10 ENCOUNTER — TELEMEDICINE (OUTPATIENT)
Dept: HEALTH INFORMATION MANAGEMENT | Facility: MEDICAL CENTER | Age: 60
End: 2020-06-10
Payer: COMMERCIAL

## 2020-06-10 VITALS — WEIGHT: 236.7 LBS | HEIGHT: 71 IN | BODY MASS INDEX: 33.14 KG/M2

## 2020-06-10 DIAGNOSIS — R73.9 HYPERGLYCEMIA: ICD-10-CM

## 2020-06-10 DIAGNOSIS — E66.09 CLASS 1 OBESITY DUE TO EXCESS CALORIES WITH SERIOUS COMORBIDITY AND BODY MASS INDEX (BMI) OF 33.0 TO 33.9 IN ADULT: ICD-10-CM

## 2020-06-10 DIAGNOSIS — I10 ESSENTIAL HYPERTENSION: ICD-10-CM

## 2020-06-10 DIAGNOSIS — E88.810 METABOLIC SYNDROME: ICD-10-CM

## 2020-06-10 DIAGNOSIS — E78.00 HYPERCHOLESTEROLEMIA: ICD-10-CM

## 2020-06-10 DIAGNOSIS — R63.5 WEIGHT GAIN: ICD-10-CM

## 2020-06-10 PROCEDURE — 99442 PR PHYSICIAN TELEPHONE EVALUATION 11-20 MIN: CPT | Mod: CR | Performed by: INTERNAL MEDICINE

## 2020-06-10 ASSESSMENT — FIBROSIS 4 INDEX: FIB4 SCORE: 1.15

## 2020-06-10 NOTE — PROGRESS NOTES
This encounter was conducted via phone.   Verbal consent was obtained. Patient's identity was verified.  Pt initiated.  Time:  14 min      Bariatric Medicine Follow Up  Chief Complaint   Patient presents with   • Weight Gain       History of Present Illness:   Isauro Franks is a 59 y.o. male who presents for weight management follow-up and to help address co-morbidities caused by overweight, as below.    During the patient's last visit, the following were discussed and recommended:  Weight Goal: 5% wt loss at one month after start (pt goal weight is 180 lb)  Diet:   Work on meal prep!  MR:  1 ND qam instead of fast food  May want to have second at [facility] if he likes them  High Protein/Low Carb dinner and 2 snacks between meals daily  >100 g protein, <100 g total carbs daily  Track daily intake with My Fitness Pal, bring to next visit  64+ oz water per day  Avoid fast food  Physical Activity: Goal is walking 2 miles per day instead of 1  Risk level for moderate/vigorous exercise program:   Low  New Rx:   Patient defers  Consider metformin or GLP-1 agonist versus anti-obesity medication ending course with above measures   Behavior change:   Planning meals/snacks, increasing activity level    He is having a very hard time to follow meal plan at this time.  He is stress eating.  Trying to make healthier food choices but still overeating.  Cannot follow lower carb, not always meal prepping.  These foods seem unappealing at this time. Does not want to have much meat.    Having apple instead of chips.  Drink more water, less diet soda.  He would like to focus more on a Mediterranean style diet, less on processed meats and ketogenic type diet.    I FG:  Not on glucose lowering agent  HTN:  Continues amlodipine, with BP well controlled  HLD:  Continues daily statin    Exercise:   Walking but not consistent the last two weeks     Review of Systems   Pt denies lightheadedness, weakness, worsening fatigue, excessive dry  "mouth, mood changes, paresthesias.  All other ROS were reviewed and are otherwise unchanged from my previous visit with patient.    Physical Exam:    Ht 1.803 m (5' 11\")   Wt 107.4 kg (236 lb 11.2 oz)   BMI 33.01 kg/m²    /80   Last weight 242 pounds  Weight change since last visit: -6 lb     Constitutional: Oriented to person, place, and time and well-developed, well-nourished, and in no distress.    Head: Normocephalic.   Psychiatric: Mood, memory, affect and judgment normal.     Laboratory:   Recent labs reviewed.      Dietitian Assessment: I have reviewed the Dietitian's assessment related to this encounter.     ASSESSMENT/PLAN:  Body mass index is 33.01 kg/m².    Obesity Stage ([place]):  1; Class 1    1. Class 1 obesity due to excess calories with serious comorbidity and body mass index (BMI) of 33.0 to 33.9 in adult     2. Weight gain     3. Hypercholesterolemia     4. Hyperglycemia     5. Essential hypertension     6. Metabolic syndrome       The patient has made some strides toward weight loss, has begun to lose weight by being more mindful about food choices and making some gradual change.  Although his activity level is low, he has begun to track his intake, focused on reducing his blood pressure.  Continue to monitor lipids, fasting glucose.    The patient and I have discussed at length and agree to the following recommendations, which are all addressing the above diagnoses:    Weight Goal: 3-5% wt loss each month (pt goal weight is 180 lb)  Diet: Resume tracking intake more consistently  Focus on lower refined carb, Mediteranean diet  Prefers less red meat, processed foods  Not currently using MR  Continue to work on meal prep  Physical Activity:  Walking at least 1 mi/d  Rx: Pt defers  Behavior change: Increase tracking, walking  Follow-up: 4-6 wks    Patient's body mass index is 33.01 kg/m². Exercise and nutrition counseling were performed at this visit.        "

## 2020-06-11 ENCOUNTER — NON-PROVIDER VISIT (OUTPATIENT)
Dept: HEALTH INFORMATION MANAGEMENT | Facility: MEDICAL CENTER | Age: 60
End: 2020-06-11
Payer: COMMERCIAL

## 2020-06-11 DIAGNOSIS — I10 ESSENTIAL HYPERTENSION: ICD-10-CM

## 2020-06-11 DIAGNOSIS — E66.9 OBESITY, UNSPECIFIED CLASSIFICATION, UNSPECIFIED OBESITY TYPE, UNSPECIFIED WHETHER SERIOUS COMORBIDITY PRESENT: ICD-10-CM

## 2020-06-11 PROCEDURE — 97803 MED NUTRITION INDIV SUBSEQ: CPT | Mod: 95,CR | Performed by: DIETITIAN, REGISTERED

## 2020-06-11 NOTE — PROGRESS NOTES
Nutrition Reassess: Medical Weight Management   Today's date: 6/11/2020  Referring Provider: No ref. provider found      Time: in/out 10:03-10:36 AM  Visit#: 3    Subjective:  - has been following an Atkins/Keto diet but now wants to follow a Mediterranean diet  - on average dines out 3x/ week (ate out 2 times yesterday at [employer] and [employer])  - does not like the ND MR rubalcava; stopped drinking them for a meal  - has not been tracking d/t not 'wanting to put in foods'  - has a Mediterranean cookbook  - has been 'stress eating' but eating fruit instead of chips  - pt report he has lost 6 pounds since starting the program (03/25)    Diet history:   Breakfast - bagel with cream cheese or 1.5 cups cereal with unsweetened almond milk  Snack - orange  Lunch - [employer]: turkey and ham sub, with 1/2 bag of chips  Snack - apple  Dinner - [employer]: burrito supreme, taco supreme  Drinks- 20 oz black tea, 40 oz ice water    Anthropometrics/Objective  Today's weight:  There were no vitals filed for this visit.  BMI:  There is no height or weight on file to calculate BMI.  Starting weight/date 242.1 lb (03/25/20)         Total weight change : N/A           Meal Plan:   High protein, low carb diet with 1-2 meal replacements per day    ReAssesment/Notes:  Everardo continues to work towards his health goals. As pt wants to start the Mediterranean diet, we first reviewed the plate method again to help with meal balance and portion control. As the Mediterranean diet contains carbs, reminded pt to follow the plate method as well for meal balance when following this diet. We dicussed following the plate method when dining out. Discussed some ideas of how to make healthier choices when dining out. Suggested for pt to review the Meal Ideas handout for some lower carb breakfast ideas to replace the bagel and cereal. Suggested for pt add 1 oz of protein to his snacks and to review the snack list he has at home. Suggest to review  nutrition basics at future visits and pt prefers to review Mediterranean diet next visit.     This encounter was conducted via Zoom .   Verbal consent was obtained. Patient's identity was verified.      Follow-up: 1 month with CASSIE/MD

## 2020-07-08 ENCOUNTER — APPOINTMENT (OUTPATIENT)
Dept: HEALTH INFORMATION MANAGEMENT | Facility: MEDICAL CENTER | Age: 60
End: 2020-07-08
Payer: COMMERCIAL

## 2020-08-03 ENCOUNTER — NURSE TRIAGE (OUTPATIENT)
Dept: HEALTH INFORMATION MANAGEMENT | Facility: OTHER | Age: 60
End: 2020-08-03

## 2020-08-03 ENCOUNTER — OFFICE VISIT (OUTPATIENT)
Dept: URGENT CARE | Facility: PHYSICIAN GROUP | Age: 60
End: 2020-08-03
Payer: COMMERCIAL

## 2020-08-03 VITALS
WEIGHT: 235 LBS | SYSTOLIC BLOOD PRESSURE: 124 MMHG | BODY MASS INDEX: 32.9 KG/M2 | DIASTOLIC BLOOD PRESSURE: 76 MMHG | HEART RATE: 93 BPM | RESPIRATION RATE: 16 BRPM | TEMPERATURE: 97.5 F | HEIGHT: 71 IN | OXYGEN SATURATION: 94 %

## 2020-08-03 DIAGNOSIS — R42 VERTIGO: ICD-10-CM

## 2020-08-03 DIAGNOSIS — R11.0 NAUSEA: ICD-10-CM

## 2020-08-03 PROCEDURE — 99214 OFFICE O/P EST MOD 30 MIN: CPT | Performed by: NURSE PRACTITIONER

## 2020-08-03 RX ORDER — MECLIZINE HYDROCHLORIDE 25 MG/1
25 TABLET ORAL 3 TIMES DAILY PRN
Qty: 30 TAB | Refills: 0 | Status: SHIPPED
Start: 2020-08-03 | End: 2020-09-14

## 2020-08-03 RX ORDER — ONDANSETRON 4 MG/1
4 TABLET, ORALLY DISINTEGRATING ORAL EVERY 8 HOURS PRN
Qty: 20 TAB | Refills: 0 | Status: SHIPPED
Start: 2020-08-03 | End: 2020-09-14

## 2020-08-03 RX ORDER — ONDANSETRON 4 MG/1
4 TABLET, ORALLY DISINTEGRATING ORAL ONCE
Status: COMPLETED | OUTPATIENT
Start: 2020-08-03 | End: 2020-08-03

## 2020-08-03 RX ADMIN — ONDANSETRON 4 MG: 4 TABLET, ORALLY DISINTEGRATING ORAL at 12:27

## 2020-08-03 ASSESSMENT — LIFESTYLE VARIABLES: SUBSTANCE_ABUSE: 0

## 2020-08-03 ASSESSMENT — ENCOUNTER SYMPTOMS
ABDOMINAL PAIN: 0
TREMORS: 0
WEIGHT LOSS: 0
COUGH: 0
NAUSEA: 1
EYE PAIN: 0
DIARRHEA: 0
FOCAL WEAKNESS: 0
VOMITING: 0
HEADACHES: 0
EYE DISCHARGE: 0
SPEECH CHANGE: 0
DIZZINESS: 1
FEVER: 0
PHOTOPHOBIA: 0
CONSTIPATION: 0
PALPITATIONS: 0
SHORTNESS OF BREATH: 0
SENSORY CHANGE: 0
TINGLING: 0
SINUS PAIN: 0
EYE REDNESS: 0
SORE THROAT: 0

## 2020-08-03 ASSESSMENT — FIBROSIS 4 INDEX: FIB4 SCORE: 1.17

## 2020-08-03 NOTE — PROGRESS NOTES
Subjective:      Everardo Franks is a 60 y.o. male who presents with Dizziness (nause, sweating, headache, x 6 hours)    Reviewed past medical, surgical and family history. Reviewed prescription and OTC medications with patient in electronic health record today  Allergies: Patient has no known allergies.        HPI there is a new problem.  Everardo is a 60-year-old male patient presents with nausea/vomiting and dizziness that started today when he woke up at about 6:00 this morning.  He reports that the room was spinning to the right.  He was able to go back to sleep for a few hours and when he woke up the symptoms had improved significantly however were still present and started making him feel a little nauseated.  His symptoms are slowly getting better but he is finding that he has to hold onto his wife in order to walk without feeling like he is going to fall over.  Treatments tried none.  He has not drank or ate anything today because he feels nauseated.  He has had no recent illness.  He denies trauma.  No other aggravating alleviating factors.        Review of Systems   Constitutional: Negative for fever, malaise/fatigue and weight loss.   HENT: Negative for congestion, ear discharge, ear pain, hearing loss, sinus pain, sore throat and tinnitus.    Eyes: Negative for photophobia, pain, discharge and redness.   Respiratory: Negative for cough and shortness of breath.    Cardiovascular: Negative for chest pain, palpitations and leg swelling.   Gastrointestinal: Positive for nausea. Negative for abdominal pain, constipation, diarrhea and vomiting.   Neurological: Positive for dizziness. Negative for tingling, tremors, sensory change, speech change, focal weakness and headaches.   Endo/Heme/Allergies: Negative for environmental allergies.   Psychiatric/Behavioral: Negative for substance abuse.          Objective:     /76 (BP Location: Left arm, Patient Position: Sitting, BP Cuff Size: Adult)   Pulse 93    "Temp 36.4 °C (97.5 °F) (Temporal)   Resp 16   Ht 1.803 m (5' 11\")   Wt 106.6 kg (235 lb)   SpO2 94%   BMI 32.78 kg/m²      Physical Exam  Vitals signs and nursing note reviewed.   Constitutional:       Appearance: Normal appearance. He is normal weight.   HENT:      Head: Normocephalic.      Right Ear: Hearing, tympanic membrane, ear canal and external ear normal.      Left Ear: Hearing, tympanic membrane, ear canal and external ear normal.      Nose: Nose normal.      Mouth/Throat:      Lips: Pink.      Mouth: Mucous membranes are moist.      Pharynx: Oropharynx is clear.   Eyes:      General: Lids are normal.      Extraocular Movements: Extraocular movements intact.      Right eye: Nystagmus present.      Left eye: Nystagmus present.      Pupils: Pupils are equal, round, and reactive to light.      Comments: Horizontal nystagmus to the right bilaterally    Neck:      Musculoskeletal: Normal range of motion. No neck rigidity.   Cardiovascular:      Rate and Rhythm: Normal rate and regular rhythm.   Musculoskeletal: Normal range of motion.   Lymphadenopathy:      Cervical: No cervical adenopathy.      Upper Body:      Right upper body: No supraclavicular adenopathy.      Left upper body: No supraclavicular adenopathy.   Skin:     General: Skin is warm.      Capillary Refill: Capillary refill takes less than 2 seconds.   Neurological:      General: No focal deficit present.      Mental Status: He is alert and oriented to person, place, and time.      Cranial Nerves: No cranial nerve deficit.      Sensory: Sensation is intact. No sensory deficit.      Motor: Motor function is intact. No weakness.      Coordination: Coordination is intact. Romberg sign negative. Coordination normal. Finger-Nose-Finger Test normal. Rapid alternating movements normal.      Gait: Gait abnormal (Antalgic. has to hold on to something when walking to prevent from feeling like he is going to fall. ).      Deep Tendon Reflexes: Reflexes " normal.      Comments: Normal facial expressions  No delay in verbal expression   focuses attention during exam without difficulty  No disorientation   Clear and coherent speech   Normal coordination  Demonstrates normal anticipated and appropriate emotions  No Memory deficit - recalls all events   No loss of consciousness   No pronator drift      Psychiatric:         Mood and Affect: Mood normal.         Behavior: Behavior normal.         Thought Content: Thought content normal.         Judgment: Judgment normal.                 Assessment/Plan:       1. Vertigo  meclizine (ANTIVERT) 25 MG Tab   2. Nausea  ondansetron (ZOFRAN ODT) dispertab 4 mg    ondansetron (ZOFRAN ODT) 4 MG TABLET DISPERSIBLE     Keep well hydrated    Do not drive if dizzy. Educated in safety.     Educated in proper administration of medication(s) ordered today including safety, possible SE, risks, benefits, rationale and alternatives to therapy.     Return to urgent care clinic or PCP   2-3 days if current symptoms are not resolving in a satisfactory manner or sooner if new or worsening symptoms occur.     Differential diagnosis, natural history, supportive care, and indications for immediate follow-up. Advised of signs and symptoms which would warrant further evaluation and /or emergent evaluation in ER.      Verbalized agreement with this treatment plan and seemed to understand without barriers. Questions were encouraged and answered to satisfaction.

## 2020-08-03 NOTE — TELEPHONE ENCOUNTER
1. Caller Name: Isauro Franks                 Call Back Number: [phone]  [facility] PCP or Specialty Provider: Yes         2.  In the last two weeks, has the patient had any new or worsening symptoms (not explained by alternative diagnosis)? Yes, the patient reports the following COVID-19 consistent symptoms: room is spinning every time he opens his eyes also w/dry heaves, sweating profusely, temp 97.2, /84 right after some dry heaves, this all started this morning.    3.  Does patient have any comoribidities? Overweight & HtN    4.  Has the patient traveled in the last 14 days OR had any known contact with someone who is suspected or confirmed to have COVID-19?  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    5. Disposition: Offered patient virtual visit to limit potential exposure to others; patient also given self care instructions    Note routed to [facility] Provider: DEE only.

## 2020-08-03 NOTE — PATIENT INSTRUCTIONS
Vertigo  Vertigo is the feeling that you or your surroundings are moving when they are not. This feeling can come and go at any time. Vertigo often goes away on its own. Vertigo can be dangerous if it occurs while you are doing something that could endanger you or others, such as driving or operating machinery.  Your health care provider will do tests to determine the cause of your vertigo. Tests will also help your health care provider decide how best to treat your condition.  Follow these instructions at home:  Eating and drinking         · Drink enough fluid to keep your urine pale yellow.  · Do not drink alcohol.  Activity  · Return to your normal activities as told by your health care provider. Ask your health care provider what activities are safe for you.  · In the morning, first sit up on the side of the bed. When you feel okay, stand slowly while you hold onto something until you know that your balance is fine.  · Move slowly. Avoid sudden body or head movements or certain positions, as told by your health care provider.  · If you have trouble walking or keeping your balance, try using a cane for stability. If you feel dizzy or unstable, sit down right away.  · Avoid doing any tasks that would cause danger to you or others if vertigo occurs.  · Avoid bending down if you feel dizzy. Place items in your home so that they are easy for you to reach without leaning over.  · Do not drive or use heavy machinery if you feel dizzy.  General instructions  · Take over-the-counter and prescription medicines only as told by your health care provider.  · Keep all follow-up visits as told by your health care provider. This is important.  Contact a health care provider if:  · Your medicines do not relieve your vertigo or they make it worse.  · You have a fever.  · Your condition gets worse or you develop new symptoms.  · Your family or friends notice any behavioral changes.  · Your nausea or vomiting gets worse.  · You  have numbness or a prickling and tingling sensation in part of your body.  Get help right away if you:  · Have difficulty moving or speaking.  · Are always dizzy.  · Faint.  · Develop severe headaches.  · Have weakness in your hands, arms, or legs.  · Have changes in your hearing or vision.  · Develop a stiff neck.  · Develop sensitivity to light.  Summary  · Vertigo is the feeling that you or your surroundings are moving when they are not.  · Your health care provider will do tests to determine the cause of your vertigo.  · Follow instructions for home care. You may be told to avoid certain tasks, positions, or movements.  · Contact a health care provider if your medicines do not relieve your symptoms, or if you have a fever, nausea, vomiting, or changes in behavior.  · Get help right away if you have severe headaches or difficulty speaking, or you develop hearing or vision problems.  This information is not intended to replace advice given to you by your health care provider. Make sure you discuss any questions you have with your health care provider.  Document Released: 09/27/2006 Document Revised: 11/11/2019 Document Reviewed: 11/11/2019  [employer] Patient Education © 2020 [employer]

## 2020-08-12 ENCOUNTER — OFFICE VISIT (OUTPATIENT)
Dept: MEDICAL GROUP | Facility: MEDICAL CENTER | Age: 60
End: 2020-08-12
Payer: COMMERCIAL

## 2020-08-12 VITALS
SYSTOLIC BLOOD PRESSURE: 100 MMHG | RESPIRATION RATE: 16 BRPM | OXYGEN SATURATION: 91 % | BODY MASS INDEX: 33.89 KG/M2 | HEART RATE: 80 BPM | DIASTOLIC BLOOD PRESSURE: 70 MMHG | HEIGHT: 71 IN | WEIGHT: 242.06 LBS | TEMPERATURE: 98.7 F

## 2020-08-12 DIAGNOSIS — Z12.5 PROSTATE CANCER SCREENING: ICD-10-CM

## 2020-08-12 DIAGNOSIS — E66.09 CLASS 1 OBESITY DUE TO EXCESS CALORIES WITH SERIOUS COMORBIDITY AND BODY MASS INDEX (BMI) OF 33.0 TO 33.9 IN ADULT: ICD-10-CM

## 2020-08-12 DIAGNOSIS — E87.6 HYPOKALEMIA: ICD-10-CM

## 2020-08-12 DIAGNOSIS — Z13.21 ENCOUNTER FOR VITAMIN DEFICIENCY SCREENING: ICD-10-CM

## 2020-08-12 DIAGNOSIS — E78.00 HYPERCHOLESTEROLEMIA: ICD-10-CM

## 2020-08-12 DIAGNOSIS — I10 ESSENTIAL HYPERTENSION: ICD-10-CM

## 2020-08-12 DIAGNOSIS — Z23 NEED FOR VACCINATION: ICD-10-CM

## 2020-08-12 DIAGNOSIS — R73.9 HYPERGLYCEMIA: ICD-10-CM

## 2020-08-12 PROCEDURE — 90471 IMMUNIZATION ADMIN: CPT | Performed by: FAMILY MEDICINE

## 2020-08-12 PROCEDURE — 99214 OFFICE O/P EST MOD 30 MIN: CPT | Mod: 25 | Performed by: FAMILY MEDICINE

## 2020-08-12 PROCEDURE — 90750 HZV VACC RECOMBINANT IM: CPT | Performed by: FAMILY MEDICINE

## 2020-08-12 ASSESSMENT — FIBROSIS 4 INDEX: FIB4 SCORE: 1.17

## 2020-08-12 NOTE — ASSESSMENT & PLAN NOTE
Mildly elevated LDL on previous labs per below. The patient reports compliance with atorvastatin 20mg QHS. The patient has been working on improving his diet although has struggled since March. He has cut down his red meat intake.     Lab Results   Component Value Date/Time    CHOLSTRLTOT 172 01/03/2020 07:16 AM     (H) 01/03/2020 07:16 AM    HDL 45 01/03/2020 07:16 AM    TRIGLYCERIDE 121 01/03/2020 07:16 AM

## 2020-08-12 NOTE — ASSESSMENT & PLAN NOTE
This is a chronic problem.  The patient started working with the health improvement program and initially lost 14 pounds however has regained 10 pounds. He is now working from home and is very sedentary. He recently started riding his stationary bike and is motivated to increase his physical activity. He continues to struggle with decreasing his carbohydrate intake.

## 2020-08-12 NOTE — PROGRESS NOTES
Subjective:     CC: f/u HTN, scheduled for lab f/u however patient has not completed    HPI:   Isauro presents today with:    Essential hypertension  This is a chronic problem. The patient reports his blood pressures have been 120s/70s-80s. He is on amlodipine 10mg daily.  He has been working with Dr. Clifford and the HIP team and lost 14 pounds; unfortunately he has regained about 10 pounds over the past couple months. The patient denies chest pain, shortness of breath, headaches, vision changes, lightheadedness, dizziness, or medication side effects.      Class 1 obesity due to excess calories with serious comorbidity and body mass index (BMI) of 33.0 to 33.9 in adult  This is a chronic problem.  The patient started working with the health improvement program and initially lost 14 pounds however has regained 10 pounds. He is now working from home and is very sedentary. He recently started riding his stationary bike and is motivated to increase his physical activity. He continues to struggle with decreasing his carbohydrate intake.      Hypercholesterolemia  Mildly elevated LDL on previous labs per below. The patient reports compliance with atorvastatin 20mg QHS. The patient has been working on improving his diet although has struggled since March. He has cut down his red meat intake.     Lab Results   Component Value Date/Time    CHOLSTRLTOT 172 01/03/2020 07:16 AM     (H) 01/03/2020 07:16 AM    HDL 45 01/03/2020 07:16 AM    TRIGLYCERIDE 121 01/03/2020 07:16 AM             Past Medical History:   Diagnosis Date   • Depression    • Epistaxis 12/1/2019   • Hypertension    • Hypotension due to hypovolemia 12/1/2019   • Melena 12/1/2019       Social History     Tobacco Use   • Smoking status: Never Smoker   • Smokeless tobacco: Never Used   Substance Use Topics   • Alcohol use: Not Currently     Frequency: Monthly or less     Drinks per session: 1 or 2     Comment: Generally 1-2 pints per week   • Drug use:  "Never       Current Outpatient Medications Ordered in Epic   Medication Sig Dispense Refill   • ondansetron (ZOFRAN ODT) 4 MG TABLET DISPERSIBLE Take 1 Tab by mouth every 8 hours as needed. 20 Tab 0   • meclizine (ANTIVERT) 25 MG Tab Take 1 Tab by mouth 3 times a day as needed. 30 Tab 0   • amLODIPine (NORVASC) 10 MG Tab Take 1 Tab by mouth every day. 90 Tab 3   • IBUPROFEN PO Take  by mouth.     • atorvastatin (LIPITOR) 20 MG Tab Take 1 Tab by mouth every bedtime. 90 Tab 3   • fluticasone (FLONASE) 50 MCG/ACT nasal spray Spray 1 Spray in nose 1 time daily as needed (for allergies).       No current Epic-ordered facility-administered medications on file.        Allergies:  Patient has no known allergies.    Health Maintenance: UTD    ROS:  Gen: no fevers/chills, no changes in weight  Eyes: no changes in vision  ENT: no sore throat, no hearing loss, no bloody nose  Pulm: no SOB  CV: no chest pain  GI: no nausea/vomiting, no diarrhea  : no dysuria  MSk: no myalgias  Skin: no rash  Neuro: no headaches, no numbness/tingling  Heme/Lymph: no easy bruising      Objective:       Exam:  /70 (BP Location: Left arm, Patient Position: Sitting, BP Cuff Size: Adult)   Pulse 80   Temp 37.1 °C (98.7 °F) (Temporal)   Resp 16   Ht 1.803 m (5' 11\")   Wt 109.8 kg (242 lb 1 oz)   SpO2 91%   BMI 33.76 kg/m²  Body mass index is 33.76 kg/m².    Gen: Obese; Alert and oriented, No apparent distress  Lungs: Normal effort, CTA bilaterally, no wheezes, rhonchi, or rales  CV: Regular rate and rhythm, no murmurs, rubs, or gallops      Assessment & Plan:     60 y.o. male with the following -     1. Essential hypertension  This is a chronic problem.  The patient's blood pressure is at goal on amlodipine 10 mg daily.  -Continue current medication regimen in addition to weight loss  - Comp Metabolic Panel; Future  - HEMOGLOBIN A1C; Future  - Lipid Profile; Future  - MICROALBUMIN CREAT RATIO URINE; Future  - CBC WITH DIFFERENTIAL; " Future    2. Class 1 obesity due to excess calories with serious comorbidity and body mass index (BMI) of 33.0 to 33.9 in adult  This is a chronic problem.  The patient has been working with health improvement program however missed his last appointment.  He is regained 10 of the 14 pounds he initially lost.  He is motivated to get back on track.  - Comp Metabolic Panel; Future  - HEMOGLOBIN A1C; Future  - Lipid Profile; Future  - CBC WITH DIFFERENTIAL; Future  - VITAMIN D,25 HYDROXY; Future    3. Hypercholesterolemia  This is a chronic problem.  The patient reports compliance with atorvastatin 20 mg nightly.  -Continue atorvastatin 20 mg nightly  - Comp Metabolic Panel; Future  - HEMOGLOBIN A1C; Future  - Lipid Profile; Future    4. Hypokalemia  Noted on previous lab.  - Comp Metabolic Panel; Future    5. Hyperglycemia  - HEMOGLOBIN A1C; Future    6. Encounter for vitamin deficiency screening  - VITAMIN D,25 HYDROXY; Future    7. Prostate cancer screening  - PROSTATE SPECIFIC AG SCREENING; Future    8. Need for vaccination  - Shingles Vaccine (Shingrix)      Return in about 1 month (around 9/12/2020) for F/U lab.    Please note this dictation was created using voice recognition software. I have made every reasonable attempt to correct obvious errors, but I expect there may be errors of grammar, and possibly content, that I did not discover before finalizing the note.

## 2020-08-12 NOTE — ASSESSMENT & PLAN NOTE
This is a chronic problem. The patient reports his blood pressures have been 120s/70s-80s. He is on amlodipine 10mg daily.  He has been working with Dr. Clifford and the HIP team and lost 14 pounds; unfortunately he has regained about 10 pounds over the past couple months. The patient denies chest pain, shortness of breath, headaches, vision changes, lightheadedness, dizziness, or medication side effects.

## 2020-09-09 ENCOUNTER — HOSPITAL ENCOUNTER (OUTPATIENT)
Dept: LAB | Facility: MEDICAL CENTER | Age: 60
End: 2020-09-09
Attending: FAMILY MEDICINE
Payer: COMMERCIAL

## 2020-09-09 DIAGNOSIS — E87.6 HYPOKALEMIA: ICD-10-CM

## 2020-09-09 DIAGNOSIS — R73.9 HYPERGLYCEMIA: ICD-10-CM

## 2020-09-09 DIAGNOSIS — I10 ESSENTIAL HYPERTENSION: ICD-10-CM

## 2020-09-09 DIAGNOSIS — Z13.21 ENCOUNTER FOR VITAMIN DEFICIENCY SCREENING: ICD-10-CM

## 2020-09-09 DIAGNOSIS — Z12.5 PROSTATE CANCER SCREENING: ICD-10-CM

## 2020-09-09 DIAGNOSIS — E66.09 CLASS 1 OBESITY DUE TO EXCESS CALORIES WITH SERIOUS COMORBIDITY AND BODY MASS INDEX (BMI) OF 33.0 TO 33.9 IN ADULT: ICD-10-CM

## 2020-09-09 LAB
25(OH)D3 SERPL-MCNC: 17 NG/ML (ref 30–100)
BASOPHILS # BLD AUTO: 0.5 % (ref 0–1.8)
BASOPHILS # BLD: 0.05 K/UL (ref 0–0.12)
CREAT UR-MCNC: 85.23 MG/DL
EOSINOPHIL # BLD AUTO: 0.13 K/UL (ref 0–0.51)
EOSINOPHIL NFR BLD: 1.3 % (ref 0–6.9)
ERYTHROCYTE [DISTWIDTH] IN BLOOD BY AUTOMATED COUNT: 43.9 FL (ref 35.9–50)
EST. AVERAGE GLUCOSE BLD GHB EST-MCNC: 134 MG/DL
HBA1C MFR BLD: 6.3 % (ref 0–5.6)
HCT VFR BLD AUTO: 47 % (ref 42–52)
HGB BLD-MCNC: 15.9 G/DL (ref 14–18)
IMM GRANULOCYTES # BLD AUTO: 0.03 K/UL (ref 0–0.11)
IMM GRANULOCYTES NFR BLD AUTO: 0.3 % (ref 0–0.9)
LYMPHOCYTES # BLD AUTO: 2.3 K/UL (ref 1–4.8)
LYMPHOCYTES NFR BLD: 23.1 % (ref 22–41)
MCH RBC QN AUTO: 28 PG (ref 27–33)
MCHC RBC AUTO-ENTMCNC: 33.8 G/DL (ref 33.7–35.3)
MCV RBC AUTO: 82.7 FL (ref 81.4–97.8)
MICROALBUMIN UR-MCNC: <1.2 MG/DL
MICROALBUMIN/CREAT UR: NORMAL MG/G (ref 0–30)
MONOCYTES # BLD AUTO: 0.49 K/UL (ref 0–0.85)
MONOCYTES NFR BLD AUTO: 4.9 % (ref 0–13.4)
NEUTROPHILS # BLD AUTO: 6.96 K/UL (ref 1.82–7.42)
NEUTROPHILS NFR BLD: 69.9 % (ref 44–72)
NRBC # BLD AUTO: 0 K/UL
NRBC BLD-RTO: 0 /100 WBC
PLATELET # BLD AUTO: 198 K/UL (ref 164–446)
PMV BLD AUTO: 11.1 FL (ref 9–12.9)
PSA SERPL-MCNC: 1.78 NG/ML (ref 0–4)
RBC # BLD AUTO: 5.68 M/UL (ref 4.7–6.1)
WBC # BLD AUTO: 10 K/UL (ref 4.8–10.8)

## 2020-09-09 PROCEDURE — 80061 LIPID PANEL: CPT

## 2020-09-09 PROCEDURE — 83036 HEMOGLOBIN GLYCOSYLATED A1C: CPT

## 2020-09-09 PROCEDURE — 82043 UR ALBUMIN QUANTITATIVE: CPT

## 2020-09-09 PROCEDURE — 85025 COMPLETE CBC W/AUTO DIFF WBC: CPT

## 2020-09-09 PROCEDURE — 82570 ASSAY OF URINE CREATININE: CPT

## 2020-09-09 PROCEDURE — 36415 COLL VENOUS BLD VENIPUNCTURE: CPT

## 2020-09-09 PROCEDURE — 84153 ASSAY OF PSA TOTAL: CPT

## 2020-09-09 PROCEDURE — 80053 COMPREHEN METABOLIC PANEL: CPT

## 2020-09-09 PROCEDURE — 82306 VITAMIN D 25 HYDROXY: CPT

## 2020-09-10 LAB
ALBUMIN SERPL BCP-MCNC: 4.4 G/DL (ref 3.2–4.9)
ALBUMIN/GLOB SERPL: 1.6 G/DL
ALP SERPL-CCNC: 123 U/L (ref 30–99)
ALT SERPL-CCNC: 27 U/L (ref 2–50)
ANION GAP SERPL CALC-SCNC: 19 MMOL/L (ref 7–16)
AST SERPL-CCNC: 25 U/L (ref 12–45)
BILIRUB SERPL-MCNC: 1.5 MG/DL (ref 0.1–1.5)
BUN SERPL-MCNC: 13 MG/DL (ref 8–22)
CALCIUM SERPL-MCNC: 8.8 MG/DL (ref 8.5–10.5)
CHLORIDE SERPL-SCNC: 98 MMOL/L (ref 96–112)
CHOLEST SERPL-MCNC: 139 MG/DL (ref 100–199)
CO2 SERPL-SCNC: 24 MMOL/L (ref 20–33)
CREAT SERPL-MCNC: 0.94 MG/DL (ref 0.5–1.4)
FASTING STATUS PATIENT QL REPORTED: NORMAL
GLOBULIN SER CALC-MCNC: 2.8 G/DL (ref 1.9–3.5)
GLUCOSE SERPL-MCNC: 113 MG/DL (ref 65–99)
HDLC SERPL-MCNC: 49 MG/DL
LDLC SERPL CALC-MCNC: 63 MG/DL
POTASSIUM SERPL-SCNC: 3.3 MMOL/L (ref 3.6–5.5)
PROT SERPL-MCNC: 7.2 G/DL (ref 6–8.2)
SODIUM SERPL-SCNC: 141 MMOL/L (ref 135–145)
TRIGL SERPL-MCNC: 137 MG/DL (ref 0–149)

## 2020-09-14 ENCOUNTER — TELEMEDICINE (OUTPATIENT)
Dept: MEDICAL GROUP | Facility: MEDICAL CENTER | Age: 60
End: 2020-09-14
Payer: COMMERCIAL

## 2020-09-14 VITALS — HEIGHT: 71 IN | WEIGHT: 242 LBS | HEART RATE: 80 BPM | BODY MASS INDEX: 33.88 KG/M2

## 2020-09-14 DIAGNOSIS — E78.00 HYPERCHOLESTEROLEMIA: ICD-10-CM

## 2020-09-14 DIAGNOSIS — E66.09 CLASS 1 OBESITY DUE TO EXCESS CALORIES WITH SERIOUS COMORBIDITY AND BODY MASS INDEX (BMI) OF 33.0 TO 33.9 IN ADULT: ICD-10-CM

## 2020-09-14 DIAGNOSIS — E55.9 VITAMIN D DEFICIENCY: ICD-10-CM

## 2020-09-14 DIAGNOSIS — R73.9 HYPERGLYCEMIA: ICD-10-CM

## 2020-09-14 DIAGNOSIS — E87.6 HYPOKALEMIA: ICD-10-CM

## 2020-09-14 DIAGNOSIS — I10 ESSENTIAL HYPERTENSION: ICD-10-CM

## 2020-09-14 PROCEDURE — 99214 OFFICE O/P EST MOD 30 MIN: CPT | Mod: 95,CR | Performed by: FAMILY MEDICINE

## 2020-09-14 ASSESSMENT — FIBROSIS 4 INDEX: FIB4 SCORE: 1.48

## 2020-09-14 NOTE — ASSESSMENT & PLAN NOTE
This is a chronic problem. The patient reports his home blood pressures have been 120s/70s-80s. He is on amlodipine 10mg daily.  He denies chest pain, shortness of breath, headaches, vision changes, lightheadedness, dizziness, or medication side effects.

## 2020-09-14 NOTE — ASSESSMENT & PLAN NOTE
This is a chronic problem. The patient was working with the HIP, lost 14 pounds, and then gained 10 pounds. He is motivated to start exercising again and improving his diet.

## 2020-09-14 NOTE — ASSESSMENT & PLAN NOTE
This is a chronic problem. HgbA1C 5.6--> 6.3. The patient reports he has been extremely sedentary with working from home since March. He was doing well with improving his diet until the beginning of the COVID19 pandemic. He states these labs were a wake-up call and he is motivated to work on lifestyle improvement.

## 2020-09-14 NOTE — PROGRESS NOTES
Telemedicine Visit: Established Patient     This evaluation was conducted via [employer] using secure and encrypted videoconferencing technology. The patient was in a private location in the state of [place].    The patient's identity was confirmed and verbal consent was obtained for this virtual visit.    Subjective:   CC: f/u labs  Isauro Franks is a 60 y.o. male presenting for evaluation and management of:    Hyperglycemia  This is a chronic problem. HgbA1C 5.6--> 6.3. The patient reports he has been extremely sedentary with working from home since March. He was doing well with improving his diet until the beginning of the COVID19 pandemic. He states these labs were a wake-up call and he is motivated to work on lifestyle improvement.    Hypercholesterolemia  This is a chronic problem. The patient reports compliance with atorvastatin 20mg QHS.     Lab Results   Component Value Date/Time    CHOLSTRLTOT 139 09/09/2020 08:07 AM    LDL 63 09/09/2020 08:07 AM    HDL 49 09/09/2020 08:07 AM    TRIGLYCERIDE 137 09/09/2020 08:07 AM           Essential hypertension  This is a chronic problem. The patient reports his home blood pressures have been 120s/70s-80s. He is on amlodipine 10mg daily.  He denies chest pain, shortness of breath, headaches, vision changes, lightheadedness, dizziness, or medication side effects.      Class 1 obesity due to excess calories with serious comorbidity and body mass index (BMI) of 33.0 to 33.9 in adult  This is a chronic problem. The patient was working with the HIP, lost 14 pounds, and then gained 10 pounds. He is motivated to start exercising again and improving his diet.      ROS   Denies any recent fevers or chills. No nausea or vomiting. No chest pains or shortness of breath.     No Known Allergies    Current medicines (including changes today)  Current Outpatient Medications   Medication Sig Dispense Refill   • amLODIPine (NORVASC) 10 MG Tab Take 1 Tab by mouth every day. 90 Tab  "3   • IBUPROFEN PO Take  by mouth.     • atorvastatin (LIPITOR) 20 MG Tab Take 1 Tab by mouth every bedtime. 90 Tab 3   • fluticasone (FLONASE) 50 MCG/ACT nasal spray Spray 1 Spray in nose 1 time daily as needed (for allergies).       No current facility-administered medications for this visit.        Patient Active Problem List    Diagnosis Date Noted   • Metabolic syndrome 06/10/2020   • Weight gain 2020   • Hypercholesterolemia 2020   • Hypokalemia 2020   • Class 1 obesity due to excess calories with serious comorbidity and body mass index (BMI) of 33.0 to 33.9 in adult 2019   • Essential hypertension 2019   • Hyperglycemia 2019       Family History   Problem Relation Age of Onset   • Arthritis Mother    • Diabetes Mother         Type 2   • Cancer Father          of pancreatic cancer at age 82   • Hypertension Father    • Stroke Father    • Alcohol abuse Father    • Cancer Sister          of breast cancer at age 70       He  has a past medical history of Depression, Epistaxis (2019), Hypertension, Hypotension due to hypovolemia (2019), and Melena (2019).  He  has a past surgical history that includes vasectomy.       Objective:   Pulse 80 Comment: pt. reported  Ht 1.803 m (5' 11\") Comment: pt. reported  Wt 109.8 kg (242 lb) Comment: pt. reported  BMI 33.75 kg/m²     Physical Exam:  Constitutional: Alert, no distress, well-groomed.  Skin: No rashes in visible areas.  Eye: Round. Conjunctiva clear, lids normal. No icterus.   ENMT: Lips pink without lesions, good dentition, moist mucous membranes. Phonation normal.  Respiratory: Unlabored respiratory effort, no cough or audible wheeze  Psych: Alert and oriented x3, normal affect and mood.       Assessment and Plan:   The following treatment plan was discussed:     1. Essential hypertension  This is a chronic problem. BP at goal on amlodipine 10mg q day.  - Continue current regimen    2. " Hyperglycemia  This is a chronic problem. HgbA1C 6.3.  - Discussed the importance of improving his diet, increasing exercise, and weight loss at length  - Discussed recommendation for diet rich in vegetables, fruits, fiber, minimal processed/packaged foods, and 150 minutes of moderate exercise per week.  - Discussed option of starting metformin; patient would like to focus on lifestyle modification and retest in 3 months  - HEMOGLOBIN A1C; Future    3. Hypokalemia  Mild hypokalemia noted on previous labs.  - Basic Metabolic Panel; Future    4. Vitamin D deficiency  - Start vit D 2,000 IU daily, patient will get OTC  - VITAMIN D,25 HYDROXY; Future    5. Hypercholesterolemia  - Continue atorvastatin 20mg qhs  - Recommended dietary changes above, discussed specifically Mediterranean type diet    Follow-up: Return in about 3 months (around 12/14/2020) for Hyperglycemia.

## 2020-09-14 NOTE — ASSESSMENT & PLAN NOTE
This is a chronic problem. The patient reports compliance with atorvastatin 20mg QHS.     Lab Results   Component Value Date/Time    CHOLSTRLTOT 139 09/09/2020 08:07 AM    LDL 63 09/09/2020 08:07 AM    HDL 49 09/09/2020 08:07 AM    TRIGLYCERIDE 137 09/09/2020 08:07 AM

## 2020-12-14 RX ORDER — ATORVASTATIN CALCIUM 20 MG/1
TABLET, FILM COATED ORAL
Qty: 90 TAB | Refills: 3 | Status: SHIPPED | OUTPATIENT
Start: 2020-12-14 | End: 2021-12-26 | Stop reason: SDUPTHER

## 2021-01-19 ENCOUNTER — APPOINTMENT (OUTPATIENT)
Dept: MEDICAL GROUP | Facility: MEDICAL CENTER | Age: 61
End: 2021-01-19
Payer: COMMERCIAL

## 2021-03-01 RX ORDER — AMLODIPINE BESYLATE 10 MG/1
10 TABLET ORAL DAILY
Qty: 90 TABLET | Refills: 3 | Status: SHIPPED
Start: 2021-03-01 | End: 2021-08-27

## 2021-03-15 DIAGNOSIS — Z23 NEED FOR VACCINATION: ICD-10-CM

## 2021-03-23 ENCOUNTER — IMMUNIZATION (OUTPATIENT)
Dept: FAMILY PLANNING/WOMEN'S HEALTH CLINIC | Facility: IMMUNIZATION CENTER | Age: 61
End: 2021-03-23
Attending: INTERNAL MEDICINE
Payer: COMMERCIAL

## 2021-03-23 DIAGNOSIS — Z23 ENCOUNTER FOR VACCINATION: Primary | ICD-10-CM

## 2021-03-23 DIAGNOSIS — Z23 NEED FOR VACCINATION: ICD-10-CM

## 2021-03-23 PROCEDURE — 0001A PFIZER SARS-COV-2 VACCINE: CPT

## 2021-03-23 PROCEDURE — 91300 PFIZER SARS-COV-2 VACCINE: CPT

## 2021-04-16 ENCOUNTER — IMMUNIZATION (OUTPATIENT)
Dept: FAMILY PLANNING/WOMEN'S HEALTH CLINIC | Facility: IMMUNIZATION CENTER | Age: 61
End: 2021-04-16
Attending: INTERNAL MEDICINE
Payer: COMMERCIAL

## 2021-04-16 DIAGNOSIS — Z23 ENCOUNTER FOR VACCINATION: Primary | ICD-10-CM

## 2021-04-16 PROCEDURE — 91300 PFIZER SARS-COV-2 VACCINE: CPT | Performed by: INTERNAL MEDICINE

## 2021-04-16 PROCEDURE — 0002A PFIZER SARS-COV-2 VACCINE: CPT | Performed by: INTERNAL MEDICINE

## 2021-07-16 ENCOUNTER — OFFICE VISIT (OUTPATIENT)
Dept: MEDICAL GROUP | Facility: MEDICAL CENTER | Age: 61
End: 2021-07-16
Payer: COMMERCIAL

## 2021-07-16 VITALS
SYSTOLIC BLOOD PRESSURE: 124 MMHG | WEIGHT: 249.2 LBS | BODY MASS INDEX: 34.89 KG/M2 | DIASTOLIC BLOOD PRESSURE: 78 MMHG | TEMPERATURE: 98.9 F | OXYGEN SATURATION: 94 % | HEIGHT: 71 IN | HEART RATE: 91 BPM | RESPIRATION RATE: 20 BRPM

## 2021-07-16 DIAGNOSIS — M25.562 ACUTE PAIN OF LEFT KNEE: ICD-10-CM

## 2021-07-16 DIAGNOSIS — R73.9 HYPERGLYCEMIA: ICD-10-CM

## 2021-07-16 DIAGNOSIS — Z12.5 PROSTATE CANCER SCREENING: ICD-10-CM

## 2021-07-16 DIAGNOSIS — I10 ESSENTIAL HYPERTENSION: ICD-10-CM

## 2021-07-16 DIAGNOSIS — E55.9 VITAMIN D DEFICIENCY: ICD-10-CM

## 2021-07-16 DIAGNOSIS — R60.0 LOWER EXTREMITY EDEMA: ICD-10-CM

## 2021-07-16 DIAGNOSIS — Z23 NEED FOR VACCINATION: ICD-10-CM

## 2021-07-16 PROCEDURE — 90750 HZV VACC RECOMBINANT IM: CPT | Performed by: FAMILY MEDICINE

## 2021-07-16 PROCEDURE — 99214 OFFICE O/P EST MOD 30 MIN: CPT | Mod: 25 | Performed by: FAMILY MEDICINE

## 2021-07-16 PROCEDURE — 90471 IMMUNIZATION ADMIN: CPT | Performed by: FAMILY MEDICINE

## 2021-07-16 RX ORDER — MELOXICAM 15 MG/1
15 TABLET ORAL DAILY
Qty: 30 TABLET | Refills: 0 | Status: SHIPPED
Start: 2021-07-16 | End: 2021-08-27

## 2021-07-16 RX ORDER — LOSARTAN POTASSIUM 50 MG/1
50 TABLET ORAL DAILY
Qty: 90 TABLET | Refills: 2 | Status: SHIPPED | OUTPATIENT
Start: 2021-07-16 | End: 2022-03-28

## 2021-07-16 ASSESSMENT — PATIENT HEALTH QUESTIONNAIRE - PHQ9: CLINICAL INTERPRETATION OF PHQ2 SCORE: 0

## 2021-07-16 ASSESSMENT — FIBROSIS 4 INDEX: FIB4 SCORE: 1.51

## 2021-07-16 NOTE — PROGRESS NOTES
"Subjective:     CC: knee pain, foot swelling    HPI:   Isauro presents today with     Acute pain of left knee  Everardo was gardening, he was kneeling for a prolonged period of time. He reports his knee felt \"off\" and hurt with ambulation the next morning. He reports the pain is anterior and medial.  His symptoms started about four weeks ago. No catching, clicking or locking. He is wearing a knee brace which does help. He takes an occasional ibuprofen which does improve the pain.     Lower extremity edema  Everardo reports bilateral LE edema which gradually started over the past month. He sits for 8-10 hours daily as he has a very sedentary job. Symptoms improve with leg elevation. No chest pain, orthopnea, or PND.     He is on amlodipine.     Essential hypertension  This is a chronic problem. The patient reports his home blood pressures have been 120s/70s-80s. He is on amlodipine 10mg daily.  He denies chest pain, shortness of breath, headaches, vision changes, lightheadedness, dizziness, or medication side effects.        Past Medical History:   Diagnosis Date   • Depression    • Epistaxis 12/1/2019   • Hypertension    • Hypotension due to hypovolemia 12/1/2019   • Melena 12/1/2019       Social History     Tobacco Use   • Smoking status: Never Smoker   • Smokeless tobacco: Never Used   Vaping Use   • Vaping Use: Never used   Substance Use Topics   • Alcohol use: Not Currently     Comment: Generally 1 pints per week   • Drug use: Never       Current Outpatient Medications Ordered in Epic   Medication Sig Dispense Refill   • meloxicam (MOBIC) 15 MG tablet Take 1 tablet by mouth every day. 30 tablet 0   • losartan (COZAAR) 50 MG Tab Take 1 tablet by mouth every day. 90 tablet 2   • amLODIPine (NORVASC) 10 MG Tab Take 1 tablet by mouth every day. 90 tablet 3   • atorvastatin (LIPITOR) 20 MG Tab TAKE ONE TABLET BY MOUTH AT BEDTIME 90 Tab 3   • fluticasone (FLONASE) 50 MCG/ACT nasal spray Spray 1 Spray in nose 1 time daily as " "needed (for allergies).       No current Epic-ordered facility-administered medications on file.       Allergies:  Patient has no known allergies.    Health Maintenance: UTD, shingrix given today    ROS:  Gen: no fevers/chills  Eyes: no changes in vision  ENT: no sore throat, no hearing loss, no bloody nose  Pulm: no SOB  CV: no chest pain        Objective:       Exam:  /78 (BP Location: Left arm, Patient Position: Sitting, BP Cuff Size: Large adult)   Pulse 91   Temp 37.2 °C (98.9 °F) (Temporal)   Resp 20   Ht 1.803 m (5' 11\")   Wt 113 kg (249 lb 3.2 oz)   SpO2 94%   BMI 34.76 kg/m²  Body mass index is 34.76 kg/m².    Gen: Alert and oriented, No apparent distress  Lungs: Normal effort, CTA bilaterally, no wheezes, rhonchi, or rales  CV: Regular rate and rhythm, no murmurs, rubs, or gallops  Extremities: 2+ bilateral pitting  Lower ext edema   Left Knee: no edema, ecchymosis, or lesions on inspection, no effusion noted, tenderness to palpation along pes anserine bursa. Full ROM with flexion and extension, strength 5/5 on extension, strength 5/5 on flexion. Mild pain with valgus stress, neg anterior drawer, neg posterior drawer        Assessment & Plan:     61 y.o. male with the following -     1. Acute pain of left knee  History and physical consistent with pes anserine bursitis.  Recommended patient continue to avoid direct pressure on the area, 5-7-day course of meloxicam, referral to PT  - REFERRAL TO PHYSICAL THERAPY    2. Lower extremity edema  Suspect venous insufficiency versus side effect of amlodipine.  Patient does have history of hypertension, developing heart failure not excluded, will proceed with echocardiogram.  We will stop amlodipine per below.  Discussed importance of lower extremity elevation and exercise.  - EC-ECHOCARDIOGRAM COMPLETE W/O CONT; Future    3. Essential hypertension  Given lower extremity edema will switch patient from amlodipine to losartan.  He will keep home BP log " and follow-up in 1 month.  If blood pressure is persistently greater than 140/90 instructed patient to take 2 tabs of losartan.  - Comp Metabolic Panel; Future  - Lipid Profile; Future  - HEMOGLOBIN A1C; Future  - MICROALBUMIN CREAT RATIO URINE; Future  - CBC WITH DIFFERENTIAL; Future  - losartan (COZAAR) 50 MG Tab; Take 1 tablet by mouth every day.  Dispense: 90 tablet; Refill: 2    4. Hyperglycemia  - Comp Metabolic Panel; Future  - Lipid Profile; Future  - HEMOGLOBIN A1C; Future    5. Vitamin D deficiency  - VITAMIN D,25 HYDROXY; Future    6. Prostate cancer screening  - PROSTATE SPECIFIC AG SCREENING; Future    7. Need for vaccination  - Shingles Vaccine (Shingrix)    Other orders  - meloxicam (MOBIC) 15 MG tablet; Take 1 tablet by mouth every day.  Dispense: 30 tablet; Refill: 0        Return in about 1 month (around 8/16/2021).    Please note this dictation was created using voice recognition software. I have made every reasonable attempt to correct obvious errors, but I expect there may be errors of grammar, and possibly content, that I did not discover before finalizing the note.

## 2021-07-16 NOTE — ASSESSMENT & PLAN NOTE
Everardo reports bilateral LE edema which gradually started over the past month. He sits for 8-10 hours daily as he has a very sedentary job. Symptoms improve with leg elevation. No chest pain, orthopnea, or PND.     He is on amlodipine.

## 2021-07-16 NOTE — ASSESSMENT & PLAN NOTE
"Everardo was gardening, he was kneeling for a prolonged period of time. He reports his knee felt \"off\" and hurt with ambulation the next morning. He reports the pain is anterior and medial.  His symptoms started about four weeks ago. No catching, clicking or locking. He is wearing a knee brace which does help. He takes an occasional ibuprofen which does improve the pain.   "

## 2021-08-04 NOTE — OP THERAPY EVALUATION
Outpatient Physical Therapy  INITIAL EVALUATION    [facility] [place]  [address], [address]  [facility] [place] [address]  Phone:  [phone]  Fax:  [phone]    Date of Evaluation: 2021    Patient: Everardo Franks  YOB: 1960  MRN: [id number]     Referring Provider: Sherrie Hu M.D.  [address]  [place] [address]   Referring Diagnosis Acute pain of left knee [M25.562]     Time Calculation  Start time: 1335  Stop time: 1420 Time Calculation (min): 45 minutes         Chief Complaint: Knee Problem    Visit Diagnoses     ICD-10-CM   1. Acute pain of left knee  M25.562       Date of onset of impairment: No data found    Subjective:   History of Present Illness:     Mechanism of injury:  Pt with increased L knee pain after kneeling for prolonged period of time on DG whlie gardening. Aching occurred at L knee a little while later and saw MD about 1 month later. He was given anti-inflammatories to take for 2 weeks which he reports significantly improved his pain. He has been off the anti-inflammatories for about 1 week now and reports he does continue to get occasional twinges in the L knee near the knee cap and he continues to walk with a limp. He reports that his pain appears to be worse with prolonged standing and walking as well as stairs. Everardo states that initially felt like L knee would want to give away, but he has not had this feeling since completing the anti-inflammatories. Pt denies numbness/tingling in B LE's.     Prior level of function:  Works from home for - sitting at desk most of day.    Headache comments: Sinus headaches, per pt not related to current injury  Sleep disturbance:  Not disrupted  Pain:     Current pain ratin    At worst pain ratin (with stairs )    Location:  L knee; outside of or underneath L patella    Quality:  Stabbing    Pain timing:  Intermittent    Alleviating factors:  elevation.  Diagnostic Tests:     None    Patient Goals:     Other patient goals:  No pain, easier walking and standing      Past Medical History:   Diagnosis Date   • Depression    • Epistaxis 12/1/2019   • Hypertension    • Hypotension due to hypovolemia 12/1/2019   • Melena 12/1/2019     Past Surgical History:   Procedure Laterality Date   • VASECTOMY       Social History     Tobacco Use   • Smoking status: Never Smoker   • Smokeless tobacco: Never Used   Substance Use Topics   • Alcohol use: Not Currently     Comment: Generally 1 pints per week     Family and Occupational History     Socioeconomic History   • Marital status:      Spouse name: Not on file   • Number of children: Not on file   • Years of education: Not on file   • Highest education level: Not on file   Occupational History   • Not on file       Objective     Observations     Additional Observation Details  Effusion at L medial tibia (pes anserine)    Neurological Testing     Sensation     Knee   Left Knee   Intact: light touch    Right Knee   Intact: light touch     Reflexes   Left   Patellar (L4): trace (1+)  Achilles (S1): absent (0)    Right   Patellar (L4): trace (1+)  Achilles (S1): absent (0)    Comments   Left Achilles (S1): unable to faciliate with Jendrassik maneuver  Right Achilles (S1): unable to faciliate with Jendrassik maneuver    Palpation   Left   No palpable tenderness to the distal biceps femoris, distal semimembranosus and distal semitendinosus.     Right   No palpable tenderness to the distal biceps femoris and distal semimembranosus.   Tenderness of the distal semitendinosus.     Additional Palpation Details  Tenderness at L pes anserine   No tenderness surrounding L patella, not tenderness at L patellar tendon, quad tendon    Active Range of Motion   Left Knee   Flexion: 125 degrees   Extension: 0 degrees with pain    Right Knee   Flexion: 127 degrees   Extension: 0 degrees     Passive Range of Motion   Left Knee  "  Flexion: 127 degrees     Right Knee   Flexion: 130 degrees     Patellar Static Positioning   Left Knee: WFL    Patellar Mobility   Left Knee Patellar tendons within functional limits include the medial, lateral, superior and inferior.     Strength:      Left Knee   Flexion: 4+  Extension: 3 (due to pain )  Quadriceps contraction: fair    Right Knee   Normal strength    Tests     Left Knee   Negative anterior Lachman, lateral Michelle, medial Michelle, patellar compression and posterior Lachman.   Ambulation     Observational Gait   Gait: antalgic     Additional Observational Gait Details  L LE ER compared to R         Therapeutic Exercises (CPT 63663):     1. Supine frog stretch , 30\" x 2    2. Seated stretching , HS 15\" x4 , shin slide (FORD) x10     4. QS- seated , 90 degrees 3\" x10 , 60 degrees 3\" x10     20. UPOC 10/1/21      Therapeutic Exercise Summary: Pt educated in HEP, provided with a handout       Time-based treatments/modalities:    Physical Therapy Timed Treatment Charges  Therapeutic exercise minutes (CPT 85308): 10 minutes      Assessment, Response and Plan:   Impairments: abnormal gait, abnormal or restricted ROM, impaired physical strength, lacks appropriate home exercise program and pain with function    Assessment details:  Pt is a pleasant 60 yo male that presents to PT with signs and symptoms most consistent with L pes anserine bursitis/ intertendinous bursa. He continues to have L medial tibial pain that limits his walking, standing, and stair performance for daily functional activities. He would benefit from skilled PT to address above listed functional impairments, improve overall function and daily activity tolerance, and enhance QOL.    Prognosis: good    Goals:   Short Term Goals:   1. Pt will be educated in HEP   2. Pt will be independent and compliant with HEP   Short term goal time span:  1-2 weeks      Long Term Goals:    1. Improve L knee flexion strength by 1/2 MMT grade for " improved strength and mobility  2. Improve L knee ext strength by 1/2 MMT grade for improved strength and mobility  3. Pt will demo non antalgic gait in clinic  4. Pt will tolerate community amb for daily tasks (I.e., grocery shopping) without increase in L knee pain   5. Pt will perform stairs without increase in L knee pain for increased community mobility  5.   Long term goal time span:  6-8 weeks    Plan:   Therapy options:  Physical therapy treatment to continue  Planned therapy interventions:  E Stim Unattended (CPT 56958), Manual Therapy (CPT 70976), Hot or Cold Pack Therapy (CPT 25418), Gait Training (CPT 03932), Neuromuscular Re-education (CPT 56659), Therapeutic Activities (CPT 02936) and Therapeutic Exercise (CPT 44094)  Frequency: 1-2x/week.  Duration in weeks:  8  Duration in visits:  8  Discussed with:  Patient      Functional Assessment Used  PT Functional Assessment Tool Used: LEFS  PT Functional Assessment Score: 61/80; 76.25% function     Referring provider co-signature:  I have reviewed this plan of care and my co-signature certifies the need for services.    Certification Period: 08/06/2021 to  10/01/21    Physician Signature: ________________________________ Date: ______________

## 2021-08-06 ENCOUNTER — PHYSICAL THERAPY (OUTPATIENT)
Dept: PHYSICAL THERAPY | Facility: REHABILITATION | Age: 61
End: 2021-08-06
Attending: FAMILY MEDICINE
Payer: COMMERCIAL

## 2021-08-06 DIAGNOSIS — M25.562 ACUTE PAIN OF LEFT KNEE: ICD-10-CM

## 2021-08-06 PROCEDURE — 97110 THERAPEUTIC EXERCISES: CPT

## 2021-08-06 PROCEDURE — 97161 PT EVAL LOW COMPLEX 20 MIN: CPT

## 2021-08-06 ASSESSMENT — ENCOUNTER SYMPTOMS
PAIN SCALE AT HIGHEST: 6
QUALITY: STABBING
PAIN SCALE: 2
PAIN TIMING: INTERMITTENT

## 2021-08-09 ENCOUNTER — PHYSICAL THERAPY (OUTPATIENT)
Dept: PHYSICAL THERAPY | Facility: REHABILITATION | Age: 61
End: 2021-08-09
Attending: FAMILY MEDICINE
Payer: COMMERCIAL

## 2021-08-09 DIAGNOSIS — M25.562 ACUTE PAIN OF LEFT KNEE: ICD-10-CM

## 2021-08-09 PROCEDURE — 97110 THERAPEUTIC EXERCISES: CPT

## 2021-08-09 NOTE — OP THERAPY DAILY TREATMENT
Outpatient Physical Therapy  DAILY TREATMENT     [facility] Outpatient Physical Therapy [place]  [address]  [place] [facility] [place] [facility] [address]  Phone:  [phone]  Fax:  [phone]    Date: 08/09/2021    Patient: Everardo Franks  YOB: 1960  MRN: [id number]     Time Calculation    Start time: 1555  Stop time: 1635 Time Calculation (min): 40 minutes         Chief Complaint: Knee Problem    Visit #: 2    SUBJECTIVE:  Was a little irritated on Saturday with the exercises, but iced it and I felt better on Sunday.     OBJECTIVE:          Therapeutic Exercises (CPT 60598):     1. Shin slide , shin slide (FORD) x10     2. PT assisted LE stretching , x5 min: hip flexor, glute, HS, piriformis     3. Prone quad stretch , with strap x1 min B    4. Prone HS curl , 0# x10     5. SLR , flex x10 , abd x10 , monitor for positioning, ext x10     8. SAQ, 0# x20 alt     9. Bridge , x10     10. Shuttle , 2L6C x20     20. UPOC 10/1/21      Therapeutic Exercise Summary: Review of HEP with addition of strengthening as pt has break in PT. Handout provided for strength HEP      Time-based treatments/modalities:    Physical Therapy Timed Treatment Charges  Therapeutic exercise minutes (CPT 10545): 40 minutes        ASSESSMENT:   Response to treatment: Able to tolerate increase in open and closed chain exercise today without increase in pain.  He does demo decreased LE flexibility overall with hip and low back compensations that have to be monitored while pt performs exercise.    PLAN/RECOMMENDATIONS:   Plan for treatment: therapy treatment to continue next visit.  Planned interventions for next visit: continue with current treatment.

## 2021-08-20 ENCOUNTER — PHYSICAL THERAPY (OUTPATIENT)
Dept: PHYSICAL THERAPY | Facility: REHABILITATION | Age: 61
End: 2021-08-20
Attending: FAMILY MEDICINE
Payer: COMMERCIAL

## 2021-08-20 DIAGNOSIS — M25.562 ACUTE PAIN OF LEFT KNEE: ICD-10-CM

## 2021-08-20 PROCEDURE — 97110 THERAPEUTIC EXERCISES: CPT

## 2021-08-20 NOTE — OP THERAPY DAILY TREATMENT
Outpatient Physical Therapy  DAILY TREATMENT     [facility] Physical [facility] [place]  [address]  [facility] [address]  Phone:  [phone]  Fax:  [phone]    Date: 08/20/2021    Patient: Everardo Franks  YOB: 1960  MRN: [id number]     Time Calculation                   Chief Complaint: No chief complaint on file.    Visit #: 3    SUBJECTIVE:  ***    OBJECTIVE:  Current objective measures: ***          Therapeutic Exercises (CPT 97604):     1. Shin slide , shin slide (FORD) x10     2. PT assisted LE stretching , x5 min: hip flexor, glute, HS, piriformis     3. Prone quad stretch , with strap x1 min B    4. Prone HS curl , 0# x10     5. SLR , flex x10 , abd x10 , monitor for positioning, ext x10     8. SAQ, 0# x20 alt     9. Bridge , x10     10. Shuttle , 2L6C x20     20. UPOC 10/1/21      Therapeutic Exercise Summary: Review of HEP with addition of strengthening as pt has break in PT. Handout provided for strength HEP      Time-based treatments/modalities:           Pain rating (1-10) before treatment:  {PAIN NUMBERS_1-10:44645}  Pain rating (1-10) after treatment:  {PAIN NUMBERS_1-10:74527}    ASSESSMENT:   Response to treatment: ***    PLAN/RECOMMENDATIONS:   Plan for treatment: therapy treatment to continue next visit.  Planned interventions for next visit: continue with current treatment.

## 2021-08-20 NOTE — OP THERAPY DAILY TREATMENT
Outpatient Physical Therapy  DAILY TREATMENT     [facility] [place]  [address]  [facility] [address]  Phone:  [phone]  Fax:  [phone]    Date: 08/20/2021    Patient: Everardo Franks  YOB: 1960  MRN: [id number]     Time Calculation    Start time: 1315  Stop time: 1400 Time Calculation (min): 45 minutes         Chief Complaint: Knee Problem    Visit #: 3    SUBJECTIVE:  Patient reports no change in symptoms. States he may have overdid his exercises and cause his knee to hurt.     OBJECTIVE:  Current objective measures:           Therapeutic Exercises (CPT 62155):     1. Shin slide , shin slide (FORD) x10     2. PT assisted LE stretching , x5 min: hip flexor, glute, HS, piriformis     3. Prone quad stretch , with strap x1 min B    4. Prone HS curl , 0# x10     5. SLR , flex x10 , abd x10 , monitor for positioning, ext x10     8. SAQ, 0# x20 alt     9. Bridge , x10     10. Shuttle , 2L6C x20     11. Tra instruction / core, x10    12. Seated on DD with LE lift    20. UPOC 10/1/21      Therapeutic Exercise Summary: Review of HEP with addition of strengthening as pt has break in PT. Handout provided for strength HEP      Time-based treatments/modalities:    Physical Therapy Timed Treatment Charges  Therapeutic exercise minutes (CPT 79338): 40 minutes      Pain rating (1-10) before treatment:  1  Pain rating (1-10) after treatment:  1    ASSESSMENT:   Response to treatment: Patient responded well to exercise with no increase in pain. Patient may benefit from continued focus on strength training and core control.     PLAN/RECOMMENDATIONS:   Plan for treatment: therapy treatment to continue next visit.  Planned interventions for next visit: continue with current treatment.

## 2021-08-23 ENCOUNTER — APPOINTMENT (OUTPATIENT)
Dept: PHYSICAL THERAPY | Facility: REHABILITATION | Age: 61
End: 2021-08-23
Attending: FAMILY MEDICINE
Payer: COMMERCIAL

## 2021-08-27 ENCOUNTER — OFFICE VISIT (OUTPATIENT)
Dept: MEDICAL GROUP | Facility: MEDICAL CENTER | Age: 61
End: 2021-08-27
Payer: COMMERCIAL

## 2021-08-27 VITALS
RESPIRATION RATE: 14 BRPM | HEART RATE: 77 BPM | WEIGHT: 246.91 LBS | DIASTOLIC BLOOD PRESSURE: 82 MMHG | TEMPERATURE: 98.6 F | SYSTOLIC BLOOD PRESSURE: 138 MMHG | OXYGEN SATURATION: 95 % | BODY MASS INDEX: 34.44 KG/M2

## 2021-08-27 DIAGNOSIS — I10 ESSENTIAL HYPERTENSION: ICD-10-CM

## 2021-08-27 DIAGNOSIS — M25.562 ACUTE PAIN OF LEFT KNEE: ICD-10-CM

## 2021-08-27 PROCEDURE — 99213 OFFICE O/P EST LOW 20 MIN: CPT | Performed by: FAMILY MEDICINE

## 2021-08-27 ASSESSMENT — FIBROSIS 4 INDEX: FIB4 SCORE: 1.51

## 2021-08-27 NOTE — ASSESSMENT & PLAN NOTE
Everardo reports his blood pressures have been well controlled status post switching from amlodipine 10 mg daily to losartan 50 mg daily.  We stopped amlodipine due to lower extremity edema.  He reports lower extremity edema is significantly improved status post cessation of amlodipine.  He has not experienced any negative side effects status post starting losartan 50 mg daily.  Home blood pressures are running 120s/80s.

## 2021-08-27 NOTE — ASSESSMENT & PLAN NOTE
"History: Everardo was gardening, he was kneeling for a prolonged period of time. He reports his knee felt \"off\" and hurt with ambulation the next morning. He reports the pain is anterior and medial. No catching, clicking or locking.     Exam was consistent with pes anserine bursitis.    He reports his pain has significantly improved with PT and an initial 2 weeks of meloxicam.   "

## 2021-08-27 NOTE — PROGRESS NOTES
"Subjective:     CC: follow up BP, knee pain    HPI:   Isauro presents today with:    Acute pain of left knee  History: Everardo was gardening, he was kneeling for a prolonged period of time. He reports his knee felt \"off\" and hurt with ambulation the next morning. He reports the pain is anterior and medial. No catching, clicking or locking.     Exam was consistent with pes anserine bursitis.    He reports his pain has significantly improved with PT and an initial 2 weeks of meloxicam.     Essential hypertension  Everardo reports his blood pressures have been well controlled status post switching from amlodipine 10 mg daily to losartan 50 mg daily.  We stopped amlodipine due to lower extremity edema.  He reports lower extremity edema is significantly improved status post cessation of amlodipine.  He has not experienced any negative side effects status post starting losartan 50 mg daily.  Home blood pressures are running 120s/80s.      Past Medical History:   Diagnosis Date   • Depression    • Epistaxis 12/1/2019   • Hypertension    • Hypotension due to hypovolemia 12/1/2019   • Melena 12/1/2019       Social History     Tobacco Use   • Smoking status: Never Smoker   • Smokeless tobacco: Never Used   Vaping Use   • Vaping Use: Never used   Substance Use Topics   • Alcohol use: Not Currently     Comment: Generally 1 pints per week   • Drug use: Never       Current Outpatient Medications Ordered in Epic   Medication Sig Dispense Refill   • losartan (COZAAR) 50 MG Tab Take 1 tablet by mouth every day. 90 tablet 2   • atorvastatin (LIPITOR) 20 MG Tab TAKE ONE TABLET BY MOUTH AT BEDTIME 90 Tab 3   • fluticasone (FLONASE) 50 MCG/ACT nasal spray Spray 1 Spray in nose 1 time daily as needed (for allergies).       No current Epic-ordered facility-administered medications on file.       Allergies:  Patient has no known allergies.    Health Maintenance: UTD    ROS:  Gen: no fevers/chills, no changes in weight  Eyes: no changes in " vision  ENT: no sore throat, no hearing loss, no bloody nose  Pulm: no SOB  CV: no chest pain        Objective:       Exam:  /82   Pulse 77   Temp 37 °C (98.6 °F)   Resp 14   Wt 112 kg (246 lb 14.6 oz)   SpO2 95%   BMI 34.44 kg/m²  Body mass index is 34.44 kg/m².    Gen: Alert and oriented, No apparent distress  Lungs: Normal effort, CTA bilaterally, no wheezes, rhonchi, or rales  CV: Regular rate and rhythm, no murmurs, rubs, or gallops      Assessment & Plan:     61 y.o. male with the following -     1. Pes anserine bursitis  Pain significantly improved s/p meloxicam and physical therapy  - Continue PT    2. Essential hypertension  BPs WNL s/p switching from amlodipine to losartan 50mg daily due to LE edema. LE edema has improved.   - Continue losartan 50mg daily    Lab work ordered at previous appointment, patient plans to complete in the next month and will schedule f/u appointment s/p completion    Return in about 2 months (around 10/27/2021).    Please note this dictation was created using voice recognition software. I have made every reasonable attempt to correct obvious errors, but I expect there may be errors of grammar, and possibly content, that I did not discover before finalizing the note.

## 2021-11-02 ENCOUNTER — HOSPITAL ENCOUNTER (OUTPATIENT)
Dept: CARDIOLOGY | Facility: MEDICAL CENTER | Age: 61
End: 2021-11-02
Attending: FAMILY MEDICINE
Payer: COMMERCIAL

## 2021-11-02 DIAGNOSIS — R60.0 LOWER EXTREMITY EDEMA: ICD-10-CM

## 2021-11-02 LAB
LV EJECT FRACT  99904: 65
LV EJECT FRACT MOD 2C 99903: 52.05
LV EJECT FRACT MOD 4C 99902: 60.04
LV EJECT FRACT MOD BP 99901: 55.82

## 2021-11-02 PROCEDURE — 93306 TTE W/DOPPLER COMPLETE: CPT

## 2021-11-02 PROCEDURE — 93306 TTE W/DOPPLER COMPLETE: CPT | Mod: 26 | Performed by: INTERNAL MEDICINE

## 2021-12-27 RX ORDER — ATORVASTATIN CALCIUM 20 MG/1
20 TABLET, FILM COATED ORAL
Qty: 90 TABLET | Refills: 3 | Status: SHIPPED | OUTPATIENT
Start: 2021-12-27 | End: 2022-10-11

## 2022-03-28 RX ORDER — LOSARTAN POTASSIUM 50 MG/1
TABLET ORAL
Qty: 90 TABLET | Refills: 0 | Status: SHIPPED | OUTPATIENT
Start: 2022-03-28 | End: 2022-07-18 | Stop reason: SDUPTHER

## 2022-07-18 RX ORDER — LOSARTAN POTASSIUM 50 MG/1
50 TABLET ORAL
Qty: 90 TABLET | Refills: 1 | Status: SHIPPED | OUTPATIENT
Start: 2022-07-18

## 2022-10-11 RX ORDER — ATORVASTATIN CALCIUM 20 MG/1
TABLET, FILM COATED ORAL
Qty: 90 TABLET | Refills: 3 | Status: SHIPPED | OUTPATIENT
Start: 2022-10-11

## 2023-01-23 RX ORDER — LOSARTAN POTASSIUM 50 MG/1
50 TABLET ORAL
Qty: 90 TABLET | Refills: 1 | Status: CANCELLED | OUTPATIENT
Start: 2023-01-23

## 2023-05-05 ENCOUNTER — HOSPITAL ENCOUNTER (OUTPATIENT)
Dept: RADIOLOGY | Facility: MEDICAL CENTER | Age: 63
End: 2023-05-05
Attending: FAMILY MEDICINE
Payer: COMMERCIAL

## 2023-05-05 ENCOUNTER — OFFICE VISIT (OUTPATIENT)
Dept: URGENT CARE | Facility: PHYSICIAN GROUP | Age: 63
End: 2023-05-05
Payer: COMMERCIAL

## 2023-05-05 VITALS
WEIGHT: 255 LBS | RESPIRATION RATE: 18 BRPM | BODY MASS INDEX: 35.7 KG/M2 | TEMPERATURE: 97.7 F | HEIGHT: 71 IN | OXYGEN SATURATION: 92 % | HEART RATE: 84 BPM

## 2023-05-05 DIAGNOSIS — R05.8 POST-VIRAL COUGH SYNDROME: ICD-10-CM

## 2023-05-05 DIAGNOSIS — J06.9 VIRAL URI WITH COUGH: ICD-10-CM

## 2023-05-05 LAB
FLUAV RNA SPEC QL NAA+PROBE: NEGATIVE
FLUBV RNA SPEC QL NAA+PROBE: NEGATIVE
RSV RNA SPEC QL NAA+PROBE: NEGATIVE
SARS-COV-2 RNA RESP QL NAA+PROBE: NEGATIVE

## 2023-05-05 PROCEDURE — 99214 OFFICE O/P EST MOD 30 MIN: CPT | Performed by: FAMILY MEDICINE

## 2023-05-05 PROCEDURE — 71046 X-RAY EXAM CHEST 2 VIEWS: CPT

## 2023-05-05 PROCEDURE — 0241U POCT CEPHEID COV-2, FLU A/B, RSV - PCR: CPT | Performed by: FAMILY MEDICINE

## 2023-05-05 RX ORDER — FLUTICASONE PROPIONATE 220 UG/1
1 AEROSOL, METERED RESPIRATORY (INHALATION) 2 TIMES DAILY
Qty: 1 G | Refills: 0 | Status: SHIPPED | OUTPATIENT
Start: 2023-05-05 | End: 2023-05-12

## 2023-05-05 RX ORDER — BENZONATATE 200 MG/1
200 CAPSULE ORAL 3 TIMES DAILY PRN
Qty: 45 CAPSULE | Refills: 0 | Status: SHIPPED | OUTPATIENT
Start: 2023-05-05

## 2023-05-05 NOTE — PROGRESS NOTES
"CC:  cough        Cough  This is a new problem. The current episode started 4-5 wks ago. The problem has been unchanged. The problem occurs constantly. The cough is mostly dry, but occasionally productive.      Pt denies:  fatigue, muscle aches, fever.     Pertinent negatives include no   headaches, nausea, vomiting, diarrhea, sweats, weight loss or wheezing. Nothing aggravates the symptoms.  Patient has tried nothing for the symptoms. There is no history of asthma.        Past Medical History:   Diagnosis Date    Melena 12/1/2019    Epistaxis 12/1/2019    Hypotension due to hypovolemia 12/1/2019    Depression     Hypertension          Social History     Tobacco Use    Smoking status: Never    Smokeless tobacco: Never   Vaping Use    Vaping Use: Never used   Substance Use Topics    Alcohol use: Not Currently     Comment: Generally 1 pints per week    Drug use: Never         Current Outpatient Medications on File Prior to Visit   Medication Sig Dispense Refill    atorvastatin (LIPITOR) 20 MG Tab TAKE ONE TABLET BY MOUTH AT BEDTIME 90 Tablet 3    losartan (COZAAR) 50 MG Tab Take 1 Tablet by mouth every day. 90 Tablet 1    fluticasone (FLONASE) 50 MCG/ACT nasal spray Spray 1 Spray in nose 1 time daily as needed (for allergies).       No current facility-administered medications on file prior to visit.                    Review of Systems   Constitutional: Negative for fever and weight loss.   HENT: negative for otalgia  Cardiovascular - denies chest pain or dyspnea  Respiratory: Positive for cough.  .  Negative for wheezing.    Neurological: Negative for headaches.   GI - denies nausea, vomiting or diarrhea  Neuro - denies numbness or tingling.            Objective:     BP (!) 172/100   Pulse 84   Temp 36.5 °C (97.7 °F)   Resp 18   Ht 1.803 m (5' 11\")   Wt 116 kg (255 lb)   SpO2 92%     Physical Exam   Constitutional: patient is oriented to person, place, and time. Patient appears well-developed and " well-nourished. No distress.   HENT:   Head: Normocephalic and atraumatic.   Right Ear: External ear normal.   Left Ear: External ear normal.   Nose - no d/c.   Right sinus exhibits no maxillary sinus tenderness. Left sinus exhibits no maxillary sinus tenderness.   Mouth/Throat: Mucous membranes are normal. No oral lesions.  No posterior pharyngeal erythema.  No oropharyngeal exudate or posterior oropharyngeal edema.   Eyes: Conjunctivae and EOM are normal. Pupils are equal, round, and reactive to light. Right eye exhibits no discharge. Left eye exhibits no discharge. No scleral icterus.   Neck: Normal range of motion. Neck supple. No tracheal deviation present.   Cardiovascular: Normal rate, regular rhythm and normal heart sounds.  Exam reveals no friction rub.    Pulmonary/Chest: Effort normal. No respiratory distress. Patient has no wheezes or rhonchi. Patient has no rales.    Musculoskeletal:  exhibits no edema.   Lymphadenopathy:     Patient has no cervical adenopathy.      Neurological: patient is alert and oriented to person, place, and time.   Skin: Skin is warm and dry. No rash noted. No erythema.   Psychiatric: patient  has a normal mood and affect.  behavior is normal.   Nursing note and vitals reviewed.         Result Notes  Details    Reading Physician Reading Date Result Priority   Sarina Villafana M.D.  [phone] 5/5/2023 Urgent Care     Narrative & Impression     5/5/2023 9:38 AM     HISTORY/REASON FOR EXAM:  Productive cough, intermittent shortness of breath     TECHNIQUE/EXAM DESCRIPTION AND NUMBER OF VIEWS:  Two views of the chest.     COMPARISON:  5/5/2023.     FINDINGS:     LUNGS: Clear. No effusions.  PNEUMOTHORAX: None.  LINES AND TUBES: None.  MEDIASTINUM: No cardiomegaly.  MUSCULOSKELETAL STRUCTURES: No acute displaced fracture.     IMPRESSION:     No acute cardiopulmonary abnormality.           Exam Ended: 05/05/23  9:44 AM Last Resulted: 05/05/23  9:48 AM               Assessment/Plan:           1. Cough  Chest x-ray was personally interpreted and reviewed. No acute cardiopulmonary findings. No pulmonary infiltrates or densities. Cardiac silhouette is normal. No hemidiaphragm elevation. No bony abnormalities.    Likely secondary to post-viral cough syndrome       - fluticasone (FLOVENT HFA) 220 MCG/ACT Aerosol; Inhale 1 Puff 2 times a day for 7 days.  Dispense: 1 g; Refill: 0  - benzonatate (TESSALON) 200 MG capsule; Take 1 Capsule by mouth 3 times a day as needed for Cough.  Dispense: 45 Capsule; Refill: 0     Follow up in one week if no improvement      My total time spent caring for the patient on the day of the encounter was 30 minutes.   This does not include time spent on separately billable procedures/tests.

## 2024-03-25 ENCOUNTER — APPOINTMENT (RX ONLY)
Dept: URBAN - METROPOLITAN AREA CLINIC 22 | Facility: CLINIC | Age: 64
Setting detail: DERMATOLOGY
End: 2024-03-25

## 2024-03-25 DIAGNOSIS — L82.1 OTHER SEBORRHEIC KERATOSIS: ICD-10-CM

## 2024-03-25 DIAGNOSIS — D22 MELANOCYTIC NEVI: ICD-10-CM

## 2024-03-25 DIAGNOSIS — L81.4 OTHER MELANIN HYPERPIGMENTATION: ICD-10-CM

## 2024-03-25 DIAGNOSIS — Z71.89 OTHER SPECIFIED COUNSELING: ICD-10-CM

## 2024-03-25 DIAGNOSIS — L21.8 OTHER SEBORRHEIC DERMATITIS: ICD-10-CM | Status: WELL CONTROLLED

## 2024-03-25 DIAGNOSIS — D18.0 HEMANGIOMA: ICD-10-CM

## 2024-03-25 PROBLEM — D22.5 MELANOCYTIC NEVI OF TRUNK: Status: ACTIVE | Noted: 2024-03-25

## 2024-03-25 PROBLEM — D18.01 HEMANGIOMA OF SKIN AND SUBCUTANEOUS TISSUE: Status: ACTIVE | Noted: 2024-03-25

## 2024-03-25 PROBLEM — D48.5 NEOPLASM OF UNCERTAIN BEHAVIOR OF SKIN: Status: ACTIVE | Noted: 2024-03-25

## 2024-03-25 PROCEDURE — 99203 OFFICE O/P NEW LOW 30 MIN: CPT | Mod: 25

## 2024-03-25 PROCEDURE — ? COUNSELING

## 2024-03-25 PROCEDURE — ? BIOPSY BY SHAVE METHOD

## 2024-03-25 PROCEDURE — ? SUNSCREEN RECOMMENDATIONS

## 2024-03-25 PROCEDURE — 11102 TANGNTL BX SKIN SINGLE LES: CPT

## 2024-03-25 ASSESSMENT — LOCATION SIMPLE DESCRIPTION DERM
LOCATION SIMPLE: RIGHT LOWER BACK
LOCATION SIMPLE: RIGHT SCALP
LOCATION SIMPLE: ABDOMEN
LOCATION SIMPLE: RIGHT UPPER ARM
LOCATION SIMPLE: LEFT UPPER BACK

## 2024-03-25 ASSESSMENT — LOCATION ZONE DERM
LOCATION ZONE: TRUNK
LOCATION ZONE: SCALP
LOCATION ZONE: ARM

## 2024-03-25 ASSESSMENT — LOCATION DETAILED DESCRIPTION DERM
LOCATION DETAILED: RIGHT ANTERIOR PROXIMAL UPPER ARM
LOCATION DETAILED: RIGHT MEDIAL FRONTAL SCALP
LOCATION DETAILED: EPIGASTRIC SKIN
LOCATION DETAILED: RIGHT SUPERIOR MEDIAL MIDBACK
LOCATION DETAILED: LEFT SUPERIOR MEDIAL UPPER BACK

## 2024-05-15 ENCOUNTER — APPOINTMENT (RX ONLY)
Dept: URBAN - METROPOLITAN AREA CLINIC 22 | Facility: CLINIC | Age: 64
Setting detail: DERMATOLOGY
End: 2024-05-15

## 2024-05-15 DIAGNOSIS — D22 MELANOCYTIC NEVI: ICD-10-CM

## 2024-05-15 PROBLEM — D22.61 MELANOCYTIC NEVI OF RIGHT UPPER LIMB, INCLUDING SHOULDER: Status: ACTIVE | Noted: 2024-05-15

## 2024-05-15 PROCEDURE — ? EXCISION

## 2024-05-15 PROCEDURE — 11402 EXC TR-EXT B9+MARG 1.1-2 CM: CPT

## 2024-05-15 PROCEDURE — 12032 INTMD RPR S/A/T/EXT 2.6-7.5: CPT

## 2024-05-15 ASSESSMENT — LOCATION ZONE DERM: LOCATION ZONE: ARM

## 2024-05-15 ASSESSMENT — LOCATION DETAILED DESCRIPTION DERM: LOCATION DETAILED: RIGHT ANTERIOR PROXIMAL UPPER ARM

## 2024-05-15 ASSESSMENT — LOCATION SIMPLE DESCRIPTION DERM: LOCATION SIMPLE: RIGHT UPPER ARM

## 2024-05-15 NOTE — PROCEDURE: EXCISION
Medical Necessity Information: It is in your best interest to select a reason for this procedure from the list below. All of these items fulfill various CMS LCD requirements except lesion extends to a margin.
Include Z78.9 (Other Specified Conditions Influencing Health Status) As An Associated Diagnosis?: No
Medical Necessity Clause: This procedure was medically necessary because the lesion that was treated was:
Lab: [id number]
Lab Facility: 
Accession #: [id number]
Surgeon Performing Repair (Optional): Mick Ahmadi PA-C
Size Of Lesion In Cm: 0.8
X Size Of Lesion In Cm (Optional): 0
Size Of Margin In Cm: 0.2
Anesthesia Volume In Cc: 2
Eye Clamp Note Details: An eye clamp was used during the procedure.
Excision Method: Elliptical
Saucerization Depth: dermis and superficial adipose tissue
Repair Type: Intermediate
Intermediate / Complex Repair - Final Wound Length In Cm: 3.5
Suturegard Retention Suture: 2-0 Nylon
Retention Suture Bite Size: 3 mm
Length To Time In Minutes Device Was In Place: 10
Number Of Hemigard Strips Per Side: 1
Undermining Type: Entire Wound
Debridement Text: The wound edges were debrided prior to proceeding with the closure to facilitate wound healing.
Helical Rim Text: The closure involved the helical rim.
Vermilion Border Text: The closure involved the vermilion border.
Nostril Rim Text: The closure involved the nostril rim.
Retention Suture Text: Retention sutures were placed to support the closure and prevent dehiscence.
Suture Removal: 14 days
Epidermal Closure Graft Donor Site (Optional): simple interrupted
Graft Donor Site Bandage (Optional-Leave Blank If You Don't Want In Note): Steri-strips and a pressure bandage were applied to the donor site.
Detail Level: Detailed
Excision Depth: adipose tissue
Scalpel Size: 15C blade
Anesthesia Type: 1% lidocaine with 1:200,000 epinephrine and a 1:10 solution of 8.4% sodium bicarbonate
Hemostasis: Electrocautery
Estimated Blood Loss (Cc): minimal
Repair Depth: use same depth as excision depth
Anesthesia Type: 1% lidocaine with epinephrine
Undermining Location (Optional): in the superficial subcutaneous fat
Deep Sutures: 3-0 Maxon
Number Of Deep Sutures (Optional): 3
Epidermal Sutures: 4-0 Prolene
Epidermal Closure: running cuticular
Wound Care: Vaseline
Dressing: pressure dressing
Suturegard Intro: Intraoperative tissue expansion was performed, utilizing the SUTUREGARD device, in order to reduce wound tension.
Suturegard Body: The suture ends were repeatedly re-tightened and re-clamped to achieve the desired tissue expansion.
Hemigard Intro: Due to skin fragility and wound tension, it was decided to use HEMIGARD adhesive retention suture devices to permit a linear closure. The skin was cleaned and dried for a 6cm distance away from the wound. Excessive hair, if present, was removed to allow for adhesion.
Hemigard Postcare Instructions: The HEMIGARD strips are to remain completely dry for at least 5-7 days.
Positioning (Leave Blank If You Do Not Want): The patient was placed in a comfortable position exposing the surgical site.
Complex Repair Preamble Text (Leave Blank If You Do Not Want): Extensive wide undermining was performed.
Intermediate Repair Preamble Text (Leave Blank If You Do Not Want): Undermining was performed with blunt dissection.
Fusiform Excision Additional Text (Leave Blank If You Do Not Want): The margin was drawn around the clinically apparent lesion.  A fusiform shape was then drawn on the skin incorporating the lesion and margins.  Incisions were then made along these lines to the appropriate tissue plane and the lesion was extirpated.
Eliptical Excision Additional Text (Leave Blank If You Do Not Want): The margin was drawn around the clinically apparent lesion.  An elliptical shape was then drawn on the skin incorporating the lesion and margins.  Incisions were then made along these lines to the appropriate tissue plane and the lesion was extirpated.
Saucerization Excision Additional Text (Leave Blank If You Do Not Want): The margin was drawn around the clinically apparent lesion.  Incisions were then made along these lines, in a tangential fashion, to the appropriate tissue plane and the lesion was extirpated.
Slit Excision Additional Text (Leave Blank If You Do Not Want): A linear line was drawn on the skin overlying the lesion. An incision was made slowly until the lesion was visualized.  Once visualized, the lesion was removed with blunt dissection.
Excisional Biopsy Additional Text (Leave Blank If You Do Not Want): The margin was drawn around the clinically apparent lesion. An elliptical shape was then drawn on the skin incorporating the lesion and margins.  Incisions were then made along these lines to the appropriate tissue plane and the lesion was extirpated.
Perilesional Excision Additional Text (Leave Blank If You Do Not Want): The margin was drawn around the clinically apparent lesion. Incisions were then made along these lines to the appropriate tissue plane and the lesion was extirpated.
Repair Performed By Another Provider Text (Leave Blank If You Do Not Want): After the tissue was excised the defect was repaired by another provider.
No Repair - Repaired With Adjacent Surgical Defect Text (Leave Blank If You Do Not Want): After the excision the defect was repaired concurrently with another surgical defect which was in close approximation.
Adjacent Tissue Transfer Text: The defect edges were debeveled with a #15 scalpel blade. Given the location of the defect and the proximity to free margins an adjacent tissue transfer was deemed most appropriate. Using a sterile surgical marker, an appropriate flap was drawn incorporating the defect and placing the expected incisions within the relaxed skin tension lines where possible. The area thus outlined was incised deep to adipose tissue with a #15 scalpel blade. The skin margins were undermined to an appropriate distance in all directions utilizing iris scissors and carried over to close the primary defect.
Advancement Flap (Single) Text: The defect edges were debeveled with a #15 scalpel blade.  Given the location of the defect and the proximity to free margins a single advancement flap was deemed most appropriate.  Using a sterile surgical marker, an appropriate advancement flap was drawn incorporating the defect and placing the expected incisions within the relaxed skin tension lines where possible.    The area thus outlined was incised deep to adipose tissue with a #15 scalpel blade.  The skin margins were undermined to an appropriate distance in all directions utilizing iris scissors.
Advancement Flap (Double) Text: The defect edges were debeveled with a #15 scalpel blade.  Given the location of the defect and the proximity to free margins a double advancement flap was deemed most appropriate.  Using a sterile surgical marker, the appropriate advancement flaps were drawn incorporating the defect and placing the expected incisions within the relaxed skin tension lines where possible.    The area thus outlined was incised deep to adipose tissue with a #15 scalpel blade.  The skin margins were undermined to an appropriate distance in all directions utilizing iris scissors.
Burow's Advancement Flap Text: The defect edges were debeveled with a #15 scalpel blade.  Given the location of the defect and the proximity to free margins a Burow's advancement flap was deemed most appropriate.  Using a sterile surgical marker, the appropriate advancement flap was drawn incorporating the defect and placing the expected incisions within the relaxed skin tension lines where possible.    The area thus outlined was incised deep to adipose tissue with a #15 scalpel blade.  The skin margins were undermined to an appropriate distance in all directions utilizing iris scissors.
Chonodrocutaneous Helical Advancement Flap Text: The defect edges were debeveled with a #15 scalpel blade. Given the location of the defect and the proximity to free margins a chondrocutaneous helical advancement flap was deemed most appropriate. Using a sterile surgical marker, the appropriate advancement flap was drawn incorporating the defect and placing the expected incisions within the relaxed skin tension lines where possible. The area thus outlined was incised deep to adipose tissue with a #15 scalpel blade. The skin margins were undermined to an appropriate distance in all directions utilizing iris scissors. Following this, the designed flap was advanced and carried over into the primary defect and sutured into place.
Crescentic Advancement Flap Text: The defect edges were debeveled with a #15 scalpel blade.  Given the location of the defect and the proximity to free margins a crescentic advancement flap was deemed most appropriate.  Using a sterile surgical marker, the appropriate advancement flap was drawn incorporating the defect and placing the expected incisions within the relaxed skin tension lines where possible.    The area thus outlined was incised deep to adipose tissue with a #15 scalpel blade.  The skin margins were undermined to an appropriate distance in all directions utilizing iris scissors.
A-T Advancement Flap Text: The defect edges were debeveled with a #15 scalpel blade.  Given the location of the defect, shape of the defect and the proximity to free margins an A-T advancement flap was deemed most appropriate.  Using a sterile surgical marker, an appropriate advancement flap was drawn incorporating the defect and placing the expected incisions within the relaxed skin tension lines where possible.    The area thus outlined was incised deep to adipose tissue with a #15 scalpel blade.  The skin margins were undermined to an appropriate distance in all directions utilizing iris scissors.
O-T Advancement Flap Text: The defect edges were debeveled with a #15 scalpel blade.  Given the location of the defect, shape of the defect and the proximity to free margins an O-T advancement flap was deemed most appropriate.  Using a sterile surgical marker, an appropriate advancement flap was drawn incorporating the defect and placing the expected incisions within the relaxed skin tension lines where possible.    The area thus outlined was incised deep to adipose tissue with a #15 scalpel blade.  The skin margins were undermined to an appropriate distance in all directions utilizing iris scissors.
O-L Flap Text: The defect edges were debeveled with a #15 scalpel blade.  Given the location of the defect, shape of the defect and the proximity to free margins an O-L flap was deemed most appropriate.  Using a sterile surgical marker, an appropriate advancement flap was drawn incorporating the defect and placing the expected incisions within the relaxed skin tension lines where possible.    The area thus outlined was incised deep to adipose tissue with a #15 scalpel blade.  The skin margins were undermined to an appropriate distance in all directions utilizing iris scissors.
O-Z Flap Text: The defect edges were debeveled with a #15 scalpel blade. Given the location of the defect, shape of the defect and the proximity to free margins an O-Z flap was deemed most appropriate. Using a sterile surgical marker, an appropriate transposition flap was drawn incorporating the defect and placing the expected incisions within the relaxed skin tension lines where possible. The area thus outlined was incised deep to adipose tissue with a #15 scalpel blade. The skin margins were undermined to an appropriate distance in all directions utilizing iris scissors. Following this, the designed flap was carried over into the primary defect and sutured into place.
Double O-Z Flap Text: The defect edges were debeveled with a #15 scalpel blade. Given the location of the defect, shape of the defect and the proximity to free margins a Double O-Z flap was deemed most appropriate. Using a sterile surgical marker, an appropriate transposition flap was drawn incorporating the defect and placing the expected incisions within the relaxed skin tension lines where possible. The area thus outlined was incised deep to adipose tissue with a #15 scalpel blade. The skin margins were undermined to an appropriate distance in all directions utilizing iris scissors. Following this, the designed flap was carried over into the primary defect and sutured into place.
V-Y Flap Text: The defect edges were debeveled with a #15 scalpel blade.  Given the location of the defect, shape of the defect and the proximity to free margins a V-Y flap was deemed most appropriate.  Using a sterile surgical marker, an appropriate advancement flap was drawn incorporating the defect and placing the expected incisions within the relaxed skin tension lines where possible.    The area thus outlined was incised deep to adipose tissue with a #15 scalpel blade.  The skin margins were undermined to an appropriate distance in all directions utilizing iris scissors.
Advancement-Rotation Flap Text: The defect edges were debeveled with a #15 scalpel blade. Given the location of the defect, shape of the defect and the proximity to free margins an advancement-rotation flap was deemed most appropriate. Using a sterile surgical marker, an appropriate flap was drawn incorporating the defect and placing the expected incisions within the relaxed skin tension lines where possible. The area thus outlined was incised deep to adipose tissue with a #15 scalpel blade. The skin margins were undermined to an appropriate distance in all directions utilizing iris scissors. Following this, the designed flap was carried over into the primary defect and sutured into place.
Mercedes Flap Text: The defect edges were debeveled with a #15 scalpel blade.  Given the location of the defect, shape of the defect and the proximity to free margins a Mercedes flap was deemed most appropriate.  Using a sterile surgical marker, an appropriate advancement flap was drawn incorporating the defect and placing the expected incisions within the relaxed skin tension lines where possible. The area thus outlined was incised deep to adipose tissue with a #15 scalpel blade.  The skin margins were undermined to an appropriate distance in all directions utilizing iris scissors.
Modified Advancement Flap Text: The defect edges were debeveled with a #15 scalpel blade.  Given the location of the defect, shape of the defect and the proximity to free margins a modified advancement flap was deemed most appropriate.  Using a sterile surgical marker, an appropriate advancement flap was drawn incorporating the defect and placing the expected incisions within the relaxed skin tension lines where possible.    The area thus outlined was incised deep to adipose tissue with a #15 scalpel blade.  The skin margins were undermined to an appropriate distance in all directions utilizing iris scissors.
Mucosal Advancement Flap Text: Given the location of the defect, shape of the defect and the proximity to free margins a mucosal advancement flap was deemed most appropriate. Incisions were made with a 15 blade scalpel in the appropriate fashion along the cutaneous vermillion border and the mucosal lip. The remaining actinically damaged mucosal tissue was excised.  The mucosal advancement flap was then elevated to the gingival sulcus with care taken to preserve the neurovascular structures and advanced into the primary defect. Care was taken to ensure that precise realignment of the vermilion border was achieved.
Peng Advancement Flap Text: The defect edges were debeveled with a #15 scalpel blade. Given the location of the defect, shape of the defect and the proximity to free margins a Peng advancement flap was deemed most appropriate. Using a sterile surgical marker, an appropriate advancement flap was drawn incorporating the defect and placing the expected incisions within the relaxed skin tension lines where possible. The area thus outlined was incised deep to adipose tissue with a #15 scalpel blade. The skin margins were undermined to an appropriate distance in all directions utilizing iris scissors. Following this, the designed flap was advanced and carried over into the primary defect and sutured into place.
Hatchet Flap Text: The defect edges were debeveled with a #15 scalpel blade.  Given the location of the defect, shape of the defect and the proximity to free margins a hatchet flap was deemed most appropriate.  Using a sterile surgical marker, an appropriate hatchet flap was drawn incorporating the defect and placing the expected incisions within the relaxed skin tension lines where possible.    The area thus outlined was incised deep to adipose tissue with a #15 scalpel blade.  The skin margins were undermined to an appropriate distance in all directions utilizing iris scissors.
Rotation Flap Text: The defect edges were debeveled with a #15 scalpel blade.  Given the location of the defect, shape of the defect and the proximity to free margins a rotation flap was deemed most appropriate.  Using a sterile surgical marker, an appropriate rotation flap was drawn incorporating the defect and placing the expected incisions within the relaxed skin tension lines where possible.    The area thus outlined was incised deep to adipose tissue with a #15 scalpel blade.  The skin margins were undermined to an appropriate distance in all directions utilizing iris scissors.
Bilateral Rotation Flap Text: The defect edges were debeveled with a #15 scalpel blade. Given the location of the defect, shape of the defect and the proximity to free margins a bilateral rotation flap was deemed most appropriate. Using a sterile surgical marker, an appropriate rotation flap was drawn incorporating the defect and placing the expected incisions within the relaxed skin tension lines where possible. The area thus outlined was incised deep to adipose tissue with a #15 scalpel blade. The skin margins were undermined to an appropriate distance in all directions utilizing iris scissors. Following this, the designed flap was carried over into the primary defect and sutured into place.
Spiral Flap Text: The defect edges were debeveled with a #15 scalpel blade.  Given the location of the defect, shape of the defect and the proximity to free margins a spiral flap was deemed most appropriate.  Using a sterile surgical marker, an appropriate rotation flap was drawn incorporating the defect and placing the expected incisions within the relaxed skin tension lines where possible. The area thus outlined was incised deep to adipose tissue with a #15 scalpel blade.  The skin margins were undermined to an appropriate distance in all directions utilizing iris scissors.
Staged Advancement Flap Text: The defect edges were debeveled with a #15 scalpel blade. Given the location of the defect, shape of the defect and the proximity to free margins a staged advancement flap was deemed most appropriate. Using a sterile surgical marker, an appropriate advancement flap was drawn incorporating the defect and placing the expected incisions within the relaxed skin tension lines where possible. The area thus outlined was incised deep to adipose tissue with a #15 scalpel blade. The skin margins were undermined to an appropriate distance in all directions utilizing iris scissors. Following this, the designed flap was carried over into the primary defect and sutured into place.
Star Wedge Flap Text: The defect edges were debeveled with a #15 scalpel blade.  Given the location of the defect, shape of the defect and the proximity to free margins a star wedge flap was deemed most appropriate.  Using a sterile surgical marker, an appropriate rotation flap was drawn incorporating the defect and placing the expected incisions within the relaxed skin tension lines where possible. The area thus outlined was incised deep to adipose tissue with a #15 scalpel blade.  The skin margins were undermined to an appropriate distance in all directions utilizing iris scissors.
Transposition Flap Text: The defect edges were debeveled with a #15 scalpel blade.  Given the location of the defect and the proximity to free margins a transposition flap was deemed most appropriate.  Using a sterile surgical marker, an appropriate transposition flap was drawn incorporating the defect.    The area thus outlined was incised deep to adipose tissue with a #15 scalpel blade.  The skin margins were undermined to an appropriate distance in all directions utilizing iris scissors.
Muscle Hinge Flap Text: The defect edges were debeveled with a #15 scalpel blade.  Given the size, depth and location of the defect and the proximity to free margins a muscle hinge flap was deemed most appropriate.  Using a sterile surgical marker, an appropriate hinge flap was drawn incorporating the defect. The area thus outlined was incised with a #15 scalpel blade.  The skin margins were undermined to an appropriate distance in all directions utilizing iris scissors.
Mustarde Flap Text: The defect edges were debeveled with a #15 scalpel blade.  Given the size, depth and location of the defect and the proximity to free margins a Mustarde flap was deemed most appropriate. Using a sterile surgical marker, an appropriate flap was drawn incorporating the defect. The area thus outlined was incised with a #15 scalpel blade. The skin margins were undermined to an appropriate distance in all directions utilizing iris scissors. Following this, the designed flap was carried into the primary defect and sutured into place.
Nasal Turnover Hinge Flap Text: The defect edges were debeveled with a #15 scalpel blade.  Given the size, depth, location of the defect and the defect being full thickness a nasal turnover hinge flap was deemed most appropriate. Using a sterile surgical marker, an appropriate hinge flap was drawn incorporating the defect. The area thus outlined was incised with a #15 scalpel blade. The flap was designed to recreate the nasal mucosal lining and the alar rim. The skin margins were undermined to an appropriate distance in all directions utilizing iris scissors. Following this, the designed flap was carried over into the primary defect and sutured into place
Nasalis-Muscle-Based Myocutaneous Island Pedicle Flap Text: Using a #15 blade, an incision was made around the donor flap to the level of the nasalis muscle. Wide lateral undermining was then performed in both the subcutaneous plane above the nasalis muscle, and in a submuscular plane just above periosteum. This allowed the formation of a free nasalis muscle axial pedicle (based on the angular artery) which was still attached to the actual cutaneous flap, increasing its mobility and vascular viability. Hemostasis was obtained with pinpoint electrocoagulation. The flap was mobilized into position and the pivotal anchor points positioned and stabilized with buried interrupted sutures. Subcutaneous and dermal tissues were closed in a multilayered fashion with sutures. Tissue redundancies were excised, and the epidermal edges were apposed without significant tension and sutured with sutures.
Nasalis Myocutaneous Flap Text: Using a #15 blade, an incision was made around the donor flap to the level of the nasalis muscle. Wide lateral undermining was then performed in both the subcutaneous plane above the nasalis muscle, and in a submuscular plane just above periosteum. This allowed the formation of a free nasalis muscle axial pedicle which was still attached to the actual cutaneous flap, increasing its mobility and vascular viability. Hemostasis was obtained with pinpoint electrocoagulation. The flap was mobilized into position and the pivotal anchor points positioned and stabilized with buried interrupted sutures. Subcutaneous and dermal tissues were closed in a multilayered fashion with sutures. Tissue redundancies were excised, and the epidermal edges were apposed without significant tension and sutured with sutures.
Orbicularis Oris Muscle Flap Text: The defect edges were debeveled with a #15 scalpel blade.  Given that the defect affected the competency of the oral sphincter an orbicularis oris muscle flap was deemed most appropriate to restore this competency and normal muscle function.  Using a sterile surgical marker, an appropriate flap was drawn incorporating the defect. The area thus outlined was incised with a #15 scalpel blade. Following this, the designed flap was carried over into the primary defect and sutured into place.
Melolabial Transposition Flap Text: The defect edges were debeveled with a #15 scalpel blade.  Given the location of the defect and the proximity to free margins a melolabial flap was deemed most appropriate.  Using a sterile surgical marker, an appropriate melolabial transposition flap was drawn incorporating the defect.    The area thus outlined was incised deep to adipose tissue with a #15 scalpel blade.  The skin margins were undermined to an appropriate distance in all directions utilizing iris scissors.
Rectangular Flap Text: The defect edges were debeveled with a #15 scalpel blade. Given the location of the defect and the proximity to free margins a rectangular flap was deemed most appropriate. Using a sterile surgical marker, an appropriate rectangular flap was drawn incorporating the defect. The area thus outlined was incised deep to adipose tissue with a #15 scalpel blade. The skin margins were undermined to an appropriate distance in all directions utilizing iris scissors. Following this, the designed flap was carried over into the primary defect and sutured into place.
Rhombic Flap Text: The defect edges were debeveled with a #15 scalpel blade.  Given the location of the defect and the proximity to free margins a rhombic flap was deemed most appropriate.  Using a sterile surgical marker, an appropriate rhombic flap was drawn incorporating the defect.    The area thus outlined was incised deep to adipose tissue with a #15 scalpel blade.  The skin margins were undermined to an appropriate distance in all directions utilizing iris scissors.
Rhomboid Transposition Flap Text: The defect edges were debeveled with a #15 scalpel blade.  Given the location of the defect and the proximity to free margins a rhomboid transposition flap was deemed most appropriate.  Using a sterile surgical marker, an appropriate rhomboid flap was drawn incorporating the defect.    The area thus outlined was incised deep to adipose tissue with a #15 scalpel blade.  The skin margins were undermined to an appropriate distance in all directions utilizing iris scissors.
Bi-Rhombic Flap Text: The defect edges were debeveled with a #15 scalpel blade.  Given the location of the defect and the proximity to free margins a bi-rhombic flap was deemed most appropriate.  Using a sterile surgical marker, an appropriate rhombic flap was drawn incorporating the defect. The area thus outlined was incised deep to adipose tissue with a #15 scalpel blade.  The skin margins were undermined to an appropriate distance in all directions utilizing iris scissors.
Helical Rim Advancement Flap Text: The defect edges were debeveled with a #15 blade scalpel.  Given the location of the defect and the proximity to free margins (helical rim) a double helical rim advancement flap was deemed most appropriate.  Using a sterile surgical marker, the appropriate advancement flaps were drawn incorporating the defect and placing the expected incisions between the helical rim and antihelix where possible.  The area thus outlined was incised through and through with a #15 scalpel blade.  With a skin hook and iris scissors, the flaps were gently and sharply undermined and freed up.
Bilateral Helical Rim Advancement Flap Text: The defect edges were debeveled with a #15 blade scalpel.  Given the location of the defect and the proximity to free margins (helical rim) a bilateral helical rim advancement flap was deemed most appropriate.  Using a sterile surgical marker, the appropriate advancement flaps were drawn incorporating the defect and placing the expected incisions between the helical rim and antihelix where possible.  The area thus outlined was incised through and through with a #15 scalpel blade.  With a skin hook and iris scissors, the flaps were gently and sharply undermined and freed up.
Ear Star Wedge Flap Text: The defect edges were debeveled with a #15 blade scalpel.  Given the location of the defect and the proximity to free margins (helical rim) an ear star wedge flap was deemed most appropriate.  Using a sterile surgical marker, the appropriate flap was drawn incorporating the defect and placing the expected incisions between the helical rim and antihelix where possible.  The area thus outlined was incised through and through with a #15 scalpel blade.
Banner Transposition Flap Text: The defect edges were debeveled with a #15 scalpel blade.  Given the location of the defect and the proximity to free margins a Banner transposition flap was deemed most appropriate.  Using a sterile surgical marker, an appropriate flap drawn around the defect. The area thus outlined was incised deep to adipose tissue with a #15 scalpel blade.  The skin margins were undermined to an appropriate distance in all directions utilizing iris scissors.
Bilobed Flap Text: The defect edges were debeveled with a #15 scalpel blade.  Given the location of the defect and the proximity to free margins a bilobe flap was deemed most appropriate.  Using a sterile surgical marker, an appropriate bilobe flap drawn around the defect.    The area thus outlined was incised deep to adipose tissue with a #15 scalpel blade.  The skin margins were undermined to an appropriate distance in all directions utilizing iris scissors.
Bilobed Transposition Flap Text: The defect edges were debeveled with a #15 scalpel blade.  Given the location of the defect and the proximity to free margins a bilobed transposition flap was deemed most appropriate.  Using a sterile surgical marker, an appropriate bilobe flap drawn around the defect.    The area thus outlined was incised deep to adipose tissue with a #15 scalpel blade.  The skin margins were undermined to an appropriate distance in all directions utilizing iris scissors.
Trilobed Flap Text: The defect edges were debeveled with a #15 scalpel blade.  Given the location of the defect and the proximity to free margins a trilobed flap was deemed most appropriate.  Using a sterile surgical marker, an appropriate trilobed flap drawn around the defect.    The area thus outlined was incised deep to adipose tissue with a #15 scalpel blade.  The skin margins were undermined to an appropriate distance in all directions utilizing iris scissors.
Dorsal Nasal Flap Text: The defect edges were debeveled with a #15 scalpel blade.  Given the location of the defect and the proximity to free margins a dorsal nasal flap was deemed most appropriate.  Using a sterile surgical marker, an appropriate dorsal nasal flap was drawn around the defect.    The area thus outlined was incised deep to adipose tissue with a #15 scalpel blade.  The skin margins were undermined to an appropriate distance in all directions utilizing iris scissors.
Island Pedicle Flap Text: The defect edges were debeveled with a #15 scalpel blade.  Given the location of the defect, shape of the defect and the proximity to free margins an island pedicle advancement flap was deemed most appropriate.  Using a sterile surgical marker, an appropriate advancement flap was drawn incorporating the defect, outlining the appropriate donor tissue and placing the expected incisions within the relaxed skin tension lines where possible.    The area thus outlined was incised deep to adipose tissue with a #15 scalpel blade.  The skin margins were undermined to an appropriate distance in all directions around the primary defect and laterally outward around the island pedicle utilizing iris scissors.  There was minimal undermining beneath the pedicle flap.
Island Pedicle Flap With Canthal Suspension Text: The defect edges were debeveled with a #15 scalpel blade.  Given the location of the defect, shape of the defect and the proximity to free margins an island pedicle advancement flap was deemed most appropriate.  Using a sterile surgical marker, an appropriate advancement flap was drawn incorporating the defect, outlining the appropriate donor tissue and placing the expected incisions within the relaxed skin tension lines where possible. The area thus outlined was incised deep to adipose tissue with a #15 scalpel blade.  The skin margins were undermined to an appropriate distance in all directions around the primary defect and laterally outward around the island pedicle utilizing iris scissors.  There was minimal undermining beneath the pedicle flap. A suspension suture was placed in the canthal tendon to prevent tension and prevent ectropion.
Alar Island Pedicle Flap Text: The defect edges were debeveled with a #15 scalpel blade.  Given the location of the defect, shape of the defect and the proximity to the alar rim an island pedicle advancement flap was deemed most appropriate.  Using a sterile surgical marker, an appropriate advancement flap was drawn incorporating the defect, outlining the appropriate donor tissue and placing the expected incisions within the nasal ala running parallel to the alar rim. The area thus outlined was incised with a #15 scalpel blade.  The skin margins were undermined minimally to an appropriate distance in all directions around the primary defect and laterally outward around the island pedicle utilizing iris scissors.  There was minimal undermining beneath the pedicle flap.
Double Island Pedicle Flap Text: The defect edges were debeveled with a #15 scalpel blade.  Given the location of the defect, shape of the defect and the proximity to free margins a double island pedicle advancement flap was deemed most appropriate.  Using a sterile surgical marker, an appropriate advancement flap was drawn incorporating the defect, outlining the appropriate donor tissue and placing the expected incisions within the relaxed skin tension lines where possible.    The area thus outlined was incised deep to adipose tissue with a #15 scalpel blade.  The skin margins were undermined to an appropriate distance in all directions around the primary defect and laterally outward around the island pedicle utilizing iris scissors.  There was minimal undermining beneath the pedicle flap.
Island Pedicle Flap-Requiring Vessel Identification Text: The defect edges were debeveled with a #15 scalpel blade.  Given the location of the defect, shape of the defect and the proximity to free margins an island pedicle advancement flap was deemed most appropriate.  Using a sterile surgical marker, an appropriate advancement flap was drawn, based on the axial vessel mentioned above, incorporating the defect, outlining the appropriate donor tissue and placing the expected incisions within the relaxed skin tension lines where possible.    The area thus outlined was incised deep to adipose tissue with a #15 scalpel blade.  The skin margins were undermined to an appropriate distance in all directions around the primary defect and laterally outward around the island pedicle utilizing iris scissors.  There was minimal undermining beneath the pedicle flap.
Keystone Flap Text: The defect edges were debeveled with a #15 scalpel blade.  Given the location of the defect, shape of the defect a keystone flap was deemed most appropriate.  Using a sterile surgical marker, an appropriate keystone flap was drawn incorporating the defect, outlining the appropriate donor tissue and placing the expected incisions within the relaxed skin tension lines where possible. The area thus outlined was incised deep to adipose tissue with a #15 scalpel blade.  The skin margins were undermined to an appropriate distance in all directions around the primary defect and laterally outward around the flap utilizing iris scissors.
O-T Plasty Text: The defect edges were debeveled with a #15 scalpel blade.  Given the location of the defect, shape of the defect and the proximity to free margins an O-T plasty was deemed most appropriate.  Using a sterile surgical marker, an appropriate O-T plasty was drawn incorporating the defect and placing the expected incisions within the relaxed skin tension lines where possible.    The area thus outlined was incised deep to adipose tissue with a #15 scalpel blade.  The skin margins were undermined to an appropriate distance in all directions utilizing iris scissors.
O-Z Plasty Text: The defect edges were debeveled with a #15 scalpel blade.  Given the location of the defect, shape of the defect and the proximity to free margins an O-Z plasty (double transposition flap) was deemed most appropriate.  Using a sterile surgical marker, the appropriate transposition flaps were drawn incorporating the defect and placing the expected incisions within the relaxed skin tension lines where possible.    The area thus outlined was incised deep to adipose tissue with a #15 scalpel blade.  The skin margins were undermined to an appropriate distance in all directions utilizing iris scissors.  Hemostasis was achieved with electrocautery.  The flaps were then transposed into place, one clockwise and the other counterclockwise, and anchored with interrupted buried subcutaneous sutures.
Double O-Z Plasty Text: The defect edges were debeveled with a #15 scalpel blade.  Given the location of the defect, shape of the defect and the proximity to free margins a Double O-Z plasty (double transposition flap) was deemed most appropriate.  Using a sterile surgical marker, the appropriate transposition flaps were drawn incorporating the defect and placing the expected incisions within the relaxed skin tension lines where possible. The area thus outlined was incised deep to adipose tissue with a #15 scalpel blade.  The skin margins were undermined to an appropriate distance in all directions utilizing iris scissors.  Hemostasis was achieved with electrocautery.  The flaps were then transposed into place, one clockwise and the other counterclockwise, and anchored with interrupted buried subcutaneous sutures.
V-Y Plasty Text: The defect edges were debeveled with a #15 scalpel blade.  Given the location of the defect, shape of the defect and the proximity to free margins an V-Y advancement flap was deemed most appropriate.  Using a sterile surgical marker, an appropriate advancement flap was drawn incorporating the defect and placing the expected incisions within the relaxed skin tension lines where possible.    The area thus outlined was incised deep to adipose tissue with a #15 scalpel blade.  The skin margins were undermined to an appropriate distance in all directions utilizing iris scissors.
H Plasty Text: Given the location of the defect, shape of the defect and the proximity to free margins a H-plasty was deemed most appropriate for repair.  Using a sterile surgical marker, the appropriate advancement arms of the H-plasty were drawn incorporating the defect and placing the expected incisions within the relaxed skin tension lines where possible. The area thus outlined was incised deep to adipose tissue with a #15 scalpel blade. The skin margins were undermined to an appropriate distance in all directions utilizing iris scissors.  The opposing advancement arms were then advanced into place in opposite direction and anchored with interrupted buried subcutaneous sutures.
W Plasty Text: The lesion was extirpated to the level of the fat with a #15 scalpel blade.  Given the location of the defect, shape of the defect and the proximity to free margins a W-plasty was deemed most appropriate for repair.  Using a sterile surgical marker, the appropriate transposition arms of the W-plasty were drawn incorporating the defect and placing the expected incisions within the relaxed skin tension lines where possible.    The area thus outlined was incised deep to adipose tissue with a #15 scalpel blade.  The skin margins were undermined to an appropriate distance in all directions utilizing iris scissors.  The opposing transposition arms were then transposed into place in opposite direction and anchored with interrupted buried subcutaneous sutures.
Z Plasty Text: The lesion was extirpated to the level of the fat with a #15 scalpel blade.  Given the location of the defect, shape of the defect and the proximity to free margins a Z-plasty was deemed most appropriate for repair.  Using a sterile surgical marker, the appropriate transposition arms of the Z-plasty were drawn incorporating the defect and placing the expected incisions within the relaxed skin tension lines where possible.    The area thus outlined was incised deep to adipose tissue with a #15 scalpel blade.  The skin margins were undermined to an appropriate distance in all directions utilizing iris scissors.  The opposing transposition arms were then transposed into place in opposite direction and anchored with interrupted buried subcutaneous sutures.
Double Z Plasty Text: The lesion was extirpated to the level of the fat with a #15 scalpel blade. Given the location of the defect, shape of the defect and the proximity to free margins a double Z-plasty was deemed most appropriate for repair. Using a sterile surgical marker, the appropriate transposition arms of the double Z-plasty were drawn incorporating the defect and placing the expected incisions within the relaxed skin tension lines where possible. The area thus outlined was incised deep to adipose tissue with a #15 scalpel blade. The skin margins were undermined to an appropriate distance in all directions utilizing iris scissors. The opposing transposition arms were then transposed and carried over into place in opposite direction and anchored with interrupted buried subcutaneous sutures.
Zygomaticofacial Flap Text: Given the location of the defect, shape of the defect and the proximity to free margins a zygomaticofacial flap was deemed most appropriate for repair. Using a sterile surgical marker, the appropriate flap was drawn incorporating the defect and placing the expected incisions within the relaxed skin tension lines where possible. The area thus outlined was incised deep to adipose tissue with a #15 scalpel blade with preservation of a vascular pedicle.  The skin margins were undermined to an appropriate distance in all directions utilizing iris scissors. The flap was then carried over into the defect and anchored with interrupted buried subcutaneous sutures.
Cheek Interpolation Flap Text: A decision was made to reconstruct the defect utilizing an interpolation axial flap and a staged reconstruction.  A telfa template was made of the defect.  This telfa template was then used to outline the Cheek Interpolation flap.  The donor area for the pedicle flap was then injected with anesthesia.  The flap was excised through the skin and subcutaneous tissue down to the layer of the underlying musculature.  The interpolation flap was carefully excised within this deep plane to maintain its blood supply.  The edges of the donor site were undermined.   The donor site was closed in a primary fashion.  The pedicle was then rotated into position and sutured.  Once the tube was sutured into place, adequate blood supply was confirmed with blanching and refill.  The pedicle was then wrapped with xeroform gauze and dressed appropriately with a telfa and gauze bandage to ensure continued blood supply and protect the attached pedicle.
Cheek-To-Nose Interpolation Flap Text: A decision was made to reconstruct the defect utilizing an interpolation axial flap and a staged reconstruction.  A telfa template was made of the defect.  This telfa template was then used to outline the Cheek-To-Nose Interpolation flap.  The donor area for the pedicle flap was then injected with anesthesia.  The flap was excised through the skin and subcutaneous tissue down to the layer of the underlying musculature.  The interpolation flap was carefully excised within this deep plane to maintain its blood supply.  The edges of the donor site were undermined.   The donor site was closed in a primary fashion.  The pedicle was then rotated into position and sutured.  Once the tube was sutured into place, adequate blood supply was confirmed with blanching and refill.  The pedicle was then wrapped with xeroform gauze and dressed appropriately with a telfa and gauze bandage to ensure continued blood supply and protect the attached pedicle.
Interpolation Flap Text: A decision was made to reconstruct the defect utilizing an interpolation axial flap and a staged reconstruction.  A telfa template was made of the defect.  This telfa template was then used to outline the interpolation flap.  The donor area for the pedicle flap was then injected with anesthesia.  The flap was excised through the skin and subcutaneous tissue down to the layer of the underlying musculature.  The interpolation flap was carefully excised within this deep plane to maintain its blood supply.  The edges of the donor site were undermined.   The donor site was closed in a primary fashion.  The pedicle was then rotated into position and sutured.  Once the tube was sutured into place, adequate blood supply was confirmed with blanching and refill.  The pedicle was then wrapped with xeroform gauze and dressed appropriately with a telfa and gauze bandage to ensure continued blood supply and protect the attached pedicle.
Melolabial Interpolation Flap Text: A decision was made to reconstruct the defect utilizing an interpolation axial flap and a staged reconstruction.  A telfa template was made of the defect.  This telfa template was then used to outline the melolabial interpolation flap.  The donor area for the pedicle flap was then injected with anesthesia.  The flap was excised through the skin and subcutaneous tissue down to the layer of the underlying musculature.  The pedicle flap was carefully excised within this deep plane to maintain its blood supply.  The edges of the donor site were undermined.   The donor site was closed in a primary fashion.  The pedicle was then rotated into position and sutured.  Once the tube was sutured into place, adequate blood supply was confirmed with blanching and refill.  The pedicle was then wrapped with xeroform gauze and dressed appropriately with a telfa and gauze bandage to ensure continued blood supply and protect the attached pedicle.
Mastoid Interpolation Flap Text: A decision was made to reconstruct the defect utilizing an interpolation axial flap and a staged reconstruction.  A telfa template was made of the defect.  This telfa template was then used to outline the mastoid interpolation flap.  The donor area for the pedicle flap was then injected with anesthesia.  The flap was excised through the skin and subcutaneous tissue down to the layer of the underlying musculature.  The pedicle flap was carefully excised within this deep plane to maintain its blood supply.  The edges of the donor site were undermined.   The donor site was closed in a primary fashion.  The pedicle was then rotated into position and sutured.  Once the tube was sutured into place, adequate blood supply was confirmed with blanching and refill.  The pedicle was then wrapped with xeroform gauze and dressed appropriately with a telfa and gauze bandage to ensure continued blood supply and protect the attached pedicle.
Posterior Auricular Interpolation Flap Text: A decision was made to reconstruct the defect utilizing an interpolation axial flap and a staged reconstruction.  A telfa template was made of the defect.  This telfa template was then used to outline the posterior auricular interpolation flap.  The donor area for the pedicle flap was then injected with anesthesia.  The flap was excised through the skin and subcutaneous tissue down to the layer of the underlying musculature.  The pedicle flap was carefully excised within this deep plane to maintain its blood supply.  The edges of the donor site were undermined.   The donor site was closed in a primary fashion.  The pedicle was then rotated into position and sutured.  Once the tube was sutured into place, adequate blood supply was confirmed with blanching and refill.  The pedicle was then wrapped with xeroform gauze and dressed appropriately with a telfa and gauze bandage to ensure continued blood supply and protect the attached pedicle.
Paramedian Forehead Flap Text: A decision was made to reconstruct the defect utilizing an interpolation axial flap and a staged reconstruction.  A telfa template was made of the defect.  This telfa template was then used to outline the paramedian forehead pedicle flap.  The donor area for the pedicle flap was then injected with anesthesia.  The flap was excised through the skin and subcutaneous tissue down to the layer of the underlying musculature.  The pedicle flap was carefully excised within this deep plane to maintain its blood supply.  The edges of the donor site were undermined.   The donor site was closed in a primary fashion.  The pedicle was then rotated into position and sutured.  Once the tube was sutured into place, adequate blood supply was confirmed with blanching and refill.  The pedicle was then wrapped with xeroform gauze and dressed appropriately with a telfa and gauze bandage to ensure continued blood supply and protect the attached pedicle.
Abbe Flap (Upper To Lower Lip) Text: The defect of the lower lip was assessed and measured.  Given the location and size of the defect, an Abbe flap was deemed most appropriate. Using a sterile surgical marker, an appropriate Abbe flap was measured and drawn on the upper lip. Local anesthesia was then infiltrated.  A scalpel was then used to incise the upper lip through and through the skin, vermilion, muscle and mucosa, leaving the flap pedicled on the opposite side.  The flap was then rotated and transferred to the lower lip defect.  The flap was then sutured into place with a three layer technique, closing the orbicularis oris muscle layer with subcutaneous buried sutures, followed by a mucosal layer and an epidermal layer.
Abbe Flap (Lower To Upper Lip) Text: The defect of the upper lip was assessed and measured.  Given the location and size of the defect, an Abbe flap was deemed most appropriate. Using a sterile surgical marker, an appropriate Abbe flap was measured and drawn on the lower lip. Local anesthesia was then infiltrated. A scalpel was then used to incise the upper lip through and through the skin, vermilion, muscle and mucosa, leaving the flap pedicled on the opposite side.  The flap was then rotated and transferred to the lower lip defect.  The flap was then sutured into place with a three layer technique, closing the orbicularis oris muscle layer with subcutaneous buried sutures, followed by a mucosal layer and an epidermal layer.
Estlander Flap (Upper To Lower Lip) Text: The defect of the lower lip was assessed and measured.  Given the location and size of the defect, an Estlander flap was deemed most appropriate. Using a sterile surgical marker, an appropriate Estlander flap was measured and drawn on the upper lip. Local anesthesia was then infiltrated. A scalpel was then used to incise the lateral aspect of the flap, through skin, muscle and mucosa, leaving the flap pedicled medially.  The flap was then rotated and positioned to fill the lower lip defect.  The flap was then sutured into place with a three layer technique, closing the orbicularis oris muscle layer with subcutaneous buried sutures, followed by a mucosal layer and an epidermal layer.
Lip Wedge Excision Repair Text: Given the location of the defect and the proximity to free margins a full thickness wedge repair was deemed most appropriate.  Using a sterile surgical marker, the appropriate repair was drawn incorporating the defect and placing the expected incisions perpendicular to the vermilion border.  The vermilion border was also meticulously outlined to ensure appropriate reapproximation during the repair.  The area thus outlined was incised through and through with a #15 scalpel blade.  The muscularis and dermis were reaproximated with deep sutures following hemostasis. Care was taken to realign the vermilion border before proceeding with the superficial closure.  Once the vermilion was realigned the superfical and mucosal closure was finished.
Ftsg Text: The defect edges were debeveled with a #15 scalpel blade.  Given the location of the defect, shape of the defect and the proximity to free margins a full thickness skin graft was deemed most appropriate.  Using a sterile surgical marker, the primary defect shape was transferred to the donor site. The area thus outlined was incised deep to adipose tissue with a #15 scalpel blade.  The harvested graft was then trimmed of adipose tissue until only dermis and epidermis was left.  The skin margins of the secondary defect were undermined to an appropriate distance in all directions utilizing iris scissors.  The secondary defect was closed with interrupted buried subcutaneous sutures.  The skin edges were then re-apposed with running  sutures.  The skin graft was then placed in the primary defect and oriented appropriately.
Split-Thickness Skin Graft Text: The defect edges were debeveled with a #15 scalpel blade.  Given the location of the defect, shape of the defect and the proximity to free margins a split thickness skin graft was deemed most appropriate.  Using a sterile surgical marker, the primary defect shape was transferred to the donor site. The split thickness graft was then harvested.  The skin graft was then placed in the primary defect and oriented appropriately.
Pinch Graft Text: The defect edges were debeveled with a #15 scalpel blade. Given the location of the defect, shape of the defect and the proximity to free margins a pinch graft was deemed most appropriate. Using a sterile surgical marker, the primary defect shape was transferred to the donor site. The area thus outlined was incised deep to adipose tissue with a #15 scalpel blade.  The harvested graft was then trimmed of adipose tissue until only dermis and epidermis was left. The skin margins of the secondary defect were undermined to an appropriate distance in all directions utilizing iris scissors.  The secondary defect was closed with interrupted buried subcutaneous sutures.  The skin edges were then re-apposed with running  sutures.  The skin graft was then placed in the primary defect and oriented appropriately.
Burow's Graft Text: The defect edges were debeveled with a #15 scalpel blade. Given the location of the defect, shape of the defect, the proximity to free margins and the presence of a standing cone deformity a Burow's skin graft was deemed most appropriate. The standing cone was removed and this tissue was then trimmed to the shape of the primary defect. The adipose tissue was also removed until only dermis and epidermis were left.  The skin margins of the secondary defect were undermined to an appropriate distance in all directions utilizing iris scissors.  The secondary defect was closed with interrupted buried subcutaneous sutures.  The skin edges were then re-apposed with running  sutures.  The skin graft was then placed in the primary defect and oriented appropriately.
Cartilage Graft Text: The defect edges were debeveled with a #15 scalpel blade.  Given the location of the defect, shape of the defect, the fact the defect involved a full thickness cartilage defect a cartilage graft was deemed most appropriate.  An appropriate donor site was identified, cleansed, and anesthetized. The cartilage graft was then harvested and transferred to the recipient site, oriented appropriately and then sutured into place.  The secondary defect was then repaired using a primary closure.
Composite Graft Text: The defect edges were debeveled with a #15 scalpel blade.  Given the location of the defect, shape of the defect, the proximity to free margins and the fact the defect was full thickness a composite graft was deemed most appropriate.  The defect was outline and then transferred to the donor site.  A full thickness graft was then excised from the donor site. The graft was then placed in the primary defect, oriented appropriately and then sutured into place.  The secondary defect was then repaired using a primary closure.
Epidermal Autograft Text: The defect edges were debeveled with a #15 scalpel blade.  Given the location of the defect, shape of the defect and the proximity to free margins an epidermal autograft was deemed most appropriate.  Using a sterile surgical marker, the primary defect shape was transferred to the donor site. The epidermal graft was then harvested.  The skin graft was then placed in the primary defect and oriented appropriately.
Dermal Autograft Text: The defect edges were debeveled with a #15 scalpel blade.  Given the location of the defect, shape of the defect and the proximity to free margins a dermal autograft was deemed most appropriate.  Using a sterile surgical marker, the primary defect shape was transferred to the donor site. The area thus outlined was incised deep to adipose tissue with a #15 scalpel blade.  The harvested graft was then trimmed of adipose and epidermal tissue until only dermis was left.  The skin graft was then placed in the primary defect and oriented appropriately.
Skin Substitute Text: The defect edges were debeveled with a #15 scalpel blade.  Given the location of the defect, shape of the defect and the proximity to free margins a skin substitute graft was deemed most appropriate.  The graft material was trimmed to fit the size of the defect. The graft was then placed in the primary defect and oriented appropriately.
Tissue Cultured Epidermal Autograft Text: The defect edges were debeveled with a #15 scalpel blade.  Given the location of the defect, shape of the defect and the proximity to free margins a tissue cultured epidermal autograft was deemed most appropriate.  The graft was then trimmed to fit the size of the defect.  The graft was then placed in the primary defect and oriented appropriately.
Xenograft Text: The defect edges were debeveled with a #15 scalpel blade.  Given the location of the defect, shape of the defect and the proximity to free margins a xenograft was deemed most appropriate.  The graft was then trimmed to fit the size of the defect.  The graft was then placed in the primary defect and oriented appropriately.
Purse String (Intermediate) Text: Given the location of the defect and the characteristics of the surrounding skin a purse string intermediate closure was deemed most appropriate.  Undermining was performed circumferentially around the surgical defect.  A purse string suture was then placed and tightened.
Purse String (Simple) Text: Given the location of the defect and the characteristics of the surrounding skin a purse string simple closure was deemed most appropriate.  Undermining was performed circumferentially around the surgical defect.  A purse string suture was then placed and tightened.
Complex Repair And Single Advancement Flap Text: The defect edges were debeveled with a #15 scalpel blade.  The primary defect was closed partially with a complex linear closure.  Given the location of the remaining defect, shape of the defect and the proximity to free margins a single advancement flap was deemed most appropriate for complete closure of the defect.  Using a sterile surgical marker, an appropriate advancement flap was drawn incorporating the defect and placing the expected incisions within the relaxed skin tension lines where possible.    The area thus outlined was incised deep to adipose tissue with a #15 scalpel blade.  The skin margins were undermined to an appropriate distance in all directions utilizing iris scissors.
Complex Repair And Double Advancement Flap Text: The defect edges were debeveled with a #15 scalpel blade.  The primary defect was closed partially with a complex linear closure.  Given the location of the remaining defect, shape of the defect and the proximity to free margins a double advancement flap was deemed most appropriate for complete closure of the defect.  Using a sterile surgical marker, an appropriate advancement flap was drawn incorporating the defect and placing the expected incisions within the relaxed skin tension lines where possible.    The area thus outlined was incised deep to adipose tissue with a #15 scalpel blade.  The skin margins were undermined to an appropriate distance in all directions utilizing iris scissors.
Complex Repair And Modified Advancement Flap Text: The defect edges were debeveled with a #15 scalpel blade.  The primary defect was closed partially with a complex linear closure.  Given the location of the remaining defect, shape of the defect and the proximity to free margins a modified advancement flap was deemed most appropriate for complete closure of the defect.  Using a sterile surgical marker, an appropriate advancement flap was drawn incorporating the defect and placing the expected incisions within the relaxed skin tension lines where possible.    The area thus outlined was incised deep to adipose tissue with a #15 scalpel blade.  The skin margins were undermined to an appropriate distance in all directions utilizing iris scissors.
Complex Repair And A-T Advancement Flap Text: The defect edges were debeveled with a #15 scalpel blade.  The primary defect was closed partially with a complex linear closure.  Given the location of the remaining defect, shape of the defect and the proximity to free margins an A-T advancement flap was deemed most appropriate for complete closure of the defect.  Using a sterile surgical marker, an appropriate advancement flap was drawn incorporating the defect and placing the expected incisions within the relaxed skin tension lines where possible.    The area thus outlined was incised deep to adipose tissue with a #15 scalpel blade.  The skin margins were undermined to an appropriate distance in all directions utilizing iris scissors.
Complex Repair And O-T Advancement Flap Text: The defect edges were debeveled with a #15 scalpel blade.  The primary defect was closed partially with a complex linear closure.  Given the location of the remaining defect, shape of the defect and the proximity to free margins an O-T advancement flap was deemed most appropriate for complete closure of the defect.  Using a sterile surgical marker, an appropriate advancement flap was drawn incorporating the defect and placing the expected incisions within the relaxed skin tension lines where possible.    The area thus outlined was incised deep to adipose tissue with a #15 scalpel blade.  The skin margins were undermined to an appropriate distance in all directions utilizing iris scissors.
Complex Repair And O-L Flap Text: The defect edges were debeveled with a #15 scalpel blade.  The primary defect was closed partially with a complex linear closure.  Given the location of the remaining defect, shape of the defect and the proximity to free margins an O-L flap was deemed most appropriate for complete closure of the defect.  Using a sterile surgical marker, an appropriate flap was drawn incorporating the defect and placing the expected incisions within the relaxed skin tension lines where possible.    The area thus outlined was incised deep to adipose tissue with a #15 scalpel blade.  The skin margins were undermined to an appropriate distance in all directions utilizing iris scissors.
Complex Repair And Bilobe Flap Text: The defect edges were debeveled with a #15 scalpel blade.  The primary defect was closed partially with a complex linear closure.  Given the location of the remaining defect, shape of the defect and the proximity to free margins a bilobe flap was deemed most appropriate for complete closure of the defect.  Using a sterile surgical marker, an appropriate advancement flap was drawn incorporating the defect and placing the expected incisions within the relaxed skin tension lines where possible.    The area thus outlined was incised deep to adipose tissue with a #15 scalpel blade.  The skin margins were undermined to an appropriate distance in all directions utilizing iris scissors.
Complex Repair And Melolabial Flap Text: The defect edges were debeveled with a #15 scalpel blade.  The primary defect was closed partially with a complex linear closure.  Given the location of the remaining defect, shape of the defect and the proximity to free margins a melolabial flap was deemed most appropriate for complete closure of the defect.  Using a sterile surgical marker, an appropriate advancement flap was drawn incorporating the defect and placing the expected incisions within the relaxed skin tension lines where possible.    The area thus outlined was incised deep to adipose tissue with a #15 scalpel blade.  The skin margins were undermined to an appropriate distance in all directions utilizing iris scissors.
Complex Repair And Rotation Flap Text: The defect edges were debeveled with a #15 scalpel blade.  The primary defect was closed partially with a complex linear closure.  Given the location of the remaining defect, shape of the defect and the proximity to free margins a rotation flap was deemed most appropriate for complete closure of the defect.  Using a sterile surgical marker, an appropriate advancement flap was drawn incorporating the defect and placing the expected incisions within the relaxed skin tension lines where possible.    The area thus outlined was incised deep to adipose tissue with a #15 scalpel blade.  The skin margins were undermined to an appropriate distance in all directions utilizing iris scissors.
Complex Repair And Rhombic Flap Text: The defect edges were debeveled with a #15 scalpel blade.  The primary defect was closed partially with a complex linear closure.  Given the location of the remaining defect, shape of the defect and the proximity to free margins a rhombic flap was deemed most appropriate for complete closure of the defect.  Using a sterile surgical marker, an appropriate advancement flap was drawn incorporating the defect and placing the expected incisions within the relaxed skin tension lines where possible.    The area thus outlined was incised deep to adipose tissue with a #15 scalpel blade.  The skin margins were undermined to an appropriate distance in all directions utilizing iris scissors.
Complex Repair And Transposition Flap Text: The defect edges were debeveled with a #15 scalpel blade.  The primary defect was closed partially with a complex linear closure.  Given the location of the remaining defect, shape of the defect and the proximity to free margins a transposition flap was deemed most appropriate for complete closure of the defect.  Using a sterile surgical marker, an appropriate advancement flap was drawn incorporating the defect and placing the expected incisions within the relaxed skin tension lines where possible.    The area thus outlined was incised deep to adipose tissue with a #15 scalpel blade.  The skin margins were undermined to an appropriate distance in all directions utilizing iris scissors.
Complex Repair And V-Y Plasty Text: The defect edges were debeveled with a #15 scalpel blade.  The primary defect was closed partially with a complex linear closure.  Given the location of the remaining defect, shape of the defect and the proximity to free margins a V-Y plasty was deemed most appropriate for complete closure of the defect.  Using a sterile surgical marker, an appropriate advancement flap was drawn incorporating the defect and placing the expected incisions within the relaxed skin tension lines where possible.    The area thus outlined was incised deep to adipose tissue with a #15 scalpel blade.  The skin margins were undermined to an appropriate distance in all directions utilizing iris scissors.
Complex Repair And M Plasty Text: The defect edges were debeveled with a #15 scalpel blade.  The primary defect was closed partially with a complex linear closure.  Given the location of the remaining defect, shape of the defect and the proximity to free margins an M plasty was deemed most appropriate for complete closure of the defect.  Using a sterile surgical marker, an appropriate advancement flap was drawn incorporating the defect and placing the expected incisions within the relaxed skin tension lines where possible.    The area thus outlined was incised deep to adipose tissue with a #15 scalpel blade.  The skin margins were undermined to an appropriate distance in all directions utilizing iris scissors.
Complex Repair And Double M Plasty Text: The defect edges were debeveled with a #15 scalpel blade.  The primary defect was closed partially with a complex linear closure.  Given the location of the remaining defect, shape of the defect and the proximity to free margins a double M plasty was deemed most appropriate for complete closure of the defect.  Using a sterile surgical marker, an appropriate advancement flap was drawn incorporating the defect and placing the expected incisions within the relaxed skin tension lines where possible.    The area thus outlined was incised deep to adipose tissue with a #15 scalpel blade.  The skin margins were undermined to an appropriate distance in all directions utilizing iris scissors.
Complex Repair And W Plasty Text: The defect edges were debeveled with a #15 scalpel blade.  The primary defect was closed partially with a complex linear closure.  Given the location of the remaining defect, shape of the defect and the proximity to free margins a W plasty was deemed most appropriate for complete closure of the defect.  Using a sterile surgical marker, an appropriate advancement flap was drawn incorporating the defect and placing the expected incisions within the relaxed skin tension lines where possible.    The area thus outlined was incised deep to adipose tissue with a #15 scalpel blade.  The skin margins were undermined to an appropriate distance in all directions utilizing iris scissors.
Complex Repair And Z Plasty Text: The defect edges were debeveled with a #15 scalpel blade.  The primary defect was closed partially with a complex linear closure.  Given the location of the remaining defect, shape of the defect and the proximity to free margins a Z plasty was deemed most appropriate for complete closure of the defect.  Using a sterile surgical marker, an appropriate advancement flap was drawn incorporating the defect and placing the expected incisions within the relaxed skin tension lines where possible.    The area thus outlined was incised deep to adipose tissue with a #15 scalpel blade.  The skin margins were undermined to an appropriate distance in all directions utilizing iris scissors.
Complex Repair And Dorsal Nasal Flap Text: The defect edges were debeveled with a #15 scalpel blade.  The primary defect was closed partially with a complex linear closure.  Given the location of the remaining defect, shape of the defect and the proximity to free margins a dorsal nasal flap was deemed most appropriate for complete closure of the defect.  Using a sterile surgical marker, an appropriate flap was drawn incorporating the defect and placing the expected incisions within the relaxed skin tension lines where possible.    The area thus outlined was incised deep to adipose tissue with a #15 scalpel blade.  The skin margins were undermined to an appropriate distance in all directions utilizing iris scissors.
Complex Repair And Ftsg Text: The defect edges were debeveled with a #15 scalpel blade.  The primary defect was closed partially with a complex linear closure.  Given the location of the defect, shape of the defect and the proximity to free margins a full thickness skin graft was deemed most appropriate to repair the remaining defect.  The graft was trimmed to fit the size of the remaining defect.  The graft was then placed in the primary defect, oriented appropriately, and sutured into place.
Complex Repair And Burow's Graft Text: The defect edges were debeveled with a #15 scalpel blade.  The primary defect was closed partially with a complex linear closure.  Given the location of the defect, shape of the defect, the proximity to free margins and the presence of a standing cone deformity a Burow's graft was deemed most appropriate to repair the remaining defect.  The graft was trimmed to fit the size of the remaining defect.  The graft was then placed in the primary defect, oriented appropriately, and sutured into place.
Complex Repair And Split-Thickness Skin Graft Text: The defect edges were debeveled with a #15 scalpel blade.  The primary defect was closed partially with a complex linear closure.  Given the location of the defect, shape of the defect and the proximity to free margins a split thickness skin graft was deemed most appropriate to repair the remaining defect.  The graft was trimmed to fit the size of the remaining defect.  The graft was then placed in the primary defect, oriented appropriately, and sutured into place.
Complex Repair And Epidermal Autograft Text: The defect edges were debeveled with a #15 scalpel blade.  The primary defect was closed partially with a complex linear closure.  Given the location of the defect, shape of the defect and the proximity to free margins an epidermal autograft was deemed most appropriate to repair the remaining defect.  The graft was trimmed to fit the size of the remaining defect.  The graft was then placed in the primary defect, oriented appropriately, and sutured into place.
Complex Repair And Dermal Autograft Text: The defect edges were debeveled with a #15 scalpel blade.  The primary defect was closed partially with a complex linear closure.  Given the location of the defect, shape of the defect and the proximity to free margins an dermal autograft was deemed most appropriate to repair the remaining defect.  The graft was trimmed to fit the size of the remaining defect.  The graft was then placed in the primary defect, oriented appropriately, and sutured into place.
Complex Repair And Tissue Cultured Epidermal Autograft Text: The defect edges were debeveled with a #15 scalpel blade.  The primary defect was closed partially with a complex linear closure.  Given the location of the defect, shape of the defect and the proximity to free margins an tissue cultured epidermal autograft was deemed most appropriate to repair the remaining defect.  The graft was trimmed to fit the size of the remaining defect.  The graft was then placed in the primary defect, oriented appropriately, and sutured into place.
Complex Repair And Xenograft Text: The defect edges were debeveled with a #15 scalpel blade.  The primary defect was closed partially with a complex linear closure.  Given the location of the defect, shape of the defect and the proximity to free margins a xenograft was deemed most appropriate to repair the remaining defect.  The graft was trimmed to fit the size of the remaining defect.  The graft was then placed in the primary defect, oriented appropriately, and sutured into place.
Complex Repair And Skin Substitute Graft Text: The defect edges were debeveled with a #15 scalpel blade.  The primary defect was closed partially with a complex linear closure.  Given the location of the remaining defect, shape of the defect and the proximity to free margins a skin substitute graft was deemed most appropriate to repair the remaining defect.  The graft was trimmed to fit the size of the remaining defect.  The graft was then placed in the primary defect, oriented appropriately, and sutured into place.
Path Notes (To The Dermatopathologist): Please check margins.
Consent was obtained from the patient. The risks and benefits to therapy were discussed in detail. Specifically, the risks of infection, scarring, bleeding, prolonged wound healing, incomplete removal, allergy to anesthesia, nerve injury and recurrence were addressed. Prior to the procedure, the treatment site was clearly identified and confirmed by the patient. All components of Universal Protocol/PAUSE Rule completed.
Render Post-Care Instructions In Note?: yes
Post-Care Instructions: I reviewed with the patient in detail post-care instructions. Patient is not to engage in any heavy lifting, exercise, or swimming for the next 14 days. Should the patient develop any fevers, chills, bleeding, severe pain patient will contact the office immediately.
Home Suture Removal Text: Patient was provided a home suture removal kit and will remove their sutures at home.  If they have any questions or difficulties they will call the office.
Where Do You Want The Question To Include Opioid Counseling Located?: Case Summary Tab
Billing Type: Third-Party Bill
Information: Selecting Yes will display possible errors in your note based on the variables you have selected. This validation is only offered as a suggestion for you. PLEASE NOTE THAT THE VALIDATION TEXT WILL BE REMOVED WHEN YOU FINALIZE YOUR NOTE. IF YOU WANT TO FAX A PRELIMINARY NOTE YOU WILL NEED TO TOGGLE THIS TO 'NO' IF YOU DO NOT WANT IT IN YOUR FAXED NOTE.

## 2024-05-29 ENCOUNTER — APPOINTMENT (RX ONLY)
Dept: URBAN - METROPOLITAN AREA CLINIC 22 | Facility: CLINIC | Age: 64
Setting detail: DERMATOLOGY
End: 2024-05-29

## 2024-05-29 DIAGNOSIS — Z48.02 ENCOUNTER FOR REMOVAL OF SUTURES: ICD-10-CM

## 2024-05-29 PROCEDURE — 99024 POSTOP FOLLOW-UP VISIT: CPT

## 2024-05-29 PROCEDURE — ? PHOTO-DOCUMENTATION

## 2024-05-29 PROCEDURE — ? SUTURE REMOVAL (GLOBAL PERIOD)

## 2024-05-29 ASSESSMENT — LOCATION ZONE DERM: LOCATION ZONE: ARM

## 2024-05-29 ASSESSMENT — LOCATION SIMPLE DESCRIPTION DERM: LOCATION SIMPLE: RIGHT UPPER ARM

## 2024-05-29 ASSESSMENT — LOCATION DETAILED DESCRIPTION DERM: LOCATION DETAILED: RIGHT ANTERIOR PROXIMAL UPPER ARM

## 2024-05-29 NOTE — PROCEDURE: SUTURE REMOVAL (GLOBAL PERIOD)
Detail Level: Simple
Add 80568 Cpt? (Important Note: In 2017 The Use Of 01193 Is Being Tracked By Cms To Determine Future Global Period Reimbursement For Global Periods): yes
Statement Selected

## 2025-03-31 ENCOUNTER — APPOINTMENT (OUTPATIENT)
Dept: URBAN - METROPOLITAN AREA CLINIC 22 | Facility: CLINIC | Age: 65
Setting detail: DERMATOLOGY
End: 2025-03-31

## 2025-03-31 DIAGNOSIS — L82.1 OTHER SEBORRHEIC KERATOSIS: ICD-10-CM

## 2025-03-31 DIAGNOSIS — D18.0 HEMANGIOMA: ICD-10-CM

## 2025-03-31 DIAGNOSIS — Z87.2 PERSONAL HISTORY OF DISEASES OF THE SKIN AND SUBCUTANEOUS TISSUE: ICD-10-CM

## 2025-03-31 DIAGNOSIS — L81.4 OTHER MELANIN HYPERPIGMENTATION: ICD-10-CM

## 2025-03-31 DIAGNOSIS — D22 MELANOCYTIC NEVI: ICD-10-CM

## 2025-03-31 DIAGNOSIS — Z71.89 OTHER SPECIFIED COUNSELING: ICD-10-CM

## 2025-03-31 PROBLEM — D22.5 MELANOCYTIC NEVI OF TRUNK: Status: ACTIVE | Noted: 2025-03-31

## 2025-03-31 PROBLEM — D18.01 HEMANGIOMA OF SKIN AND SUBCUTANEOUS TISSUE: Status: ACTIVE | Noted: 2025-03-31

## 2025-03-31 PROBLEM — D48.5 NEOPLASM OF UNCERTAIN BEHAVIOR OF SKIN: Status: ACTIVE | Noted: 2025-03-31

## 2025-03-31 PROCEDURE — 11102 TANGNTL BX SKIN SINGLE LES: CPT

## 2025-03-31 PROCEDURE — ? BIOPSY BY SHAVE METHOD

## 2025-03-31 PROCEDURE — ? COUNSELING

## 2025-03-31 PROCEDURE — ? SUNSCREEN RECOMMENDATIONS

## 2025-03-31 PROCEDURE — 99213 OFFICE O/P EST LOW 20 MIN: CPT | Mod: 25

## 2025-03-31 ASSESSMENT — LOCATION DETAILED DESCRIPTION DERM
LOCATION DETAILED: LEFT SUPERIOR MEDIAL UPPER BACK
LOCATION DETAILED: RIGHT SUPERIOR MEDIAL MIDBACK
LOCATION DETAILED: EPIGASTRIC SKIN
LOCATION DETAILED: RIGHT ANTERIOR PROXIMAL UPPER ARM
LOCATION DETAILED: PERIUMBILICAL SKIN

## 2025-03-31 ASSESSMENT — LOCATION SIMPLE DESCRIPTION DERM
LOCATION SIMPLE: ABDOMEN
LOCATION SIMPLE: RIGHT UPPER ARM
LOCATION SIMPLE: LEFT UPPER BACK
LOCATION SIMPLE: RIGHT LOWER BACK

## 2025-03-31 ASSESSMENT — LOCATION ZONE DERM
LOCATION ZONE: TRUNK
LOCATION ZONE: ARM